# Patient Record
Sex: FEMALE | Race: WHITE | NOT HISPANIC OR LATINO | Employment: PART TIME | URBAN - METROPOLITAN AREA
[De-identification: names, ages, dates, MRNs, and addresses within clinical notes are randomized per-mention and may not be internally consistent; named-entity substitution may affect disease eponyms.]

---

## 2017-01-19 ENCOUNTER — OFFICE VISIT - HEALTHEAST (OUTPATIENT)
Dept: FAMILY MEDICINE | Facility: CLINIC | Age: 37
End: 2017-01-19

## 2017-01-19 DIAGNOSIS — F41.1 GENERALIZED ANXIETY DISORDER: ICD-10-CM

## 2017-03-16 ENCOUNTER — OFFICE VISIT - HEALTHEAST (OUTPATIENT)
Dept: FAMILY MEDICINE | Facility: CLINIC | Age: 37
End: 2017-03-16

## 2017-03-16 DIAGNOSIS — R53.83 OTHER FATIGUE: ICD-10-CM

## 2017-03-16 DIAGNOSIS — F41.1 GENERALIZED ANXIETY DISORDER: ICD-10-CM

## 2017-03-24 ENCOUNTER — COMMUNICATION - HEALTHEAST (OUTPATIENT)
Dept: FAMILY MEDICINE | Facility: CLINIC | Age: 37
End: 2017-03-24

## 2017-05-16 ENCOUNTER — COMMUNICATION - HEALTHEAST (OUTPATIENT)
Dept: FAMILY MEDICINE | Facility: CLINIC | Age: 37
End: 2017-05-16

## 2017-05-23 ENCOUNTER — COMMUNICATION - HEALTHEAST (OUTPATIENT)
Dept: SCHEDULING | Facility: CLINIC | Age: 37
End: 2017-05-23

## 2017-06-01 ENCOUNTER — OFFICE VISIT - HEALTHEAST (OUTPATIENT)
Dept: FAMILY MEDICINE | Facility: CLINIC | Age: 37
End: 2017-06-01

## 2017-06-01 DIAGNOSIS — F41.1 GENERALIZED ANXIETY DISORDER: ICD-10-CM

## 2017-06-15 ENCOUNTER — COMMUNICATION - HEALTHEAST (OUTPATIENT)
Dept: FAMILY MEDICINE | Facility: CLINIC | Age: 37
End: 2017-06-15

## 2017-09-06 ENCOUNTER — OFFICE VISIT - HEALTHEAST (OUTPATIENT)
Dept: FAMILY MEDICINE | Facility: CLINIC | Age: 37
End: 2017-09-06

## 2017-09-06 DIAGNOSIS — F41.1 GENERALIZED ANXIETY DISORDER: ICD-10-CM

## 2017-09-06 DIAGNOSIS — M22.2X1 PATELLOFEMORAL SYNDROME OF BOTH KNEES: ICD-10-CM

## 2017-09-06 DIAGNOSIS — M22.2X2 PATELLOFEMORAL SYNDROME OF BOTH KNEES: ICD-10-CM

## 2017-10-04 ENCOUNTER — AMBULATORY - HEALTHEAST (OUTPATIENT)
Dept: NURSING | Facility: CLINIC | Age: 37
End: 2017-10-04

## 2017-10-11 ENCOUNTER — COMMUNICATION - HEALTHEAST (OUTPATIENT)
Dept: FAMILY MEDICINE | Facility: CLINIC | Age: 37
End: 2017-10-11

## 2017-11-06 ENCOUNTER — OFFICE VISIT - HEALTHEAST (OUTPATIENT)
Dept: FAMILY MEDICINE | Facility: CLINIC | Age: 37
End: 2017-11-06

## 2017-11-06 DIAGNOSIS — F41.1 GENERALIZED ANXIETY DISORDER: ICD-10-CM

## 2018-02-20 ENCOUNTER — COMMUNICATION - HEALTHEAST (OUTPATIENT)
Dept: FAMILY MEDICINE | Facility: CLINIC | Age: 38
End: 2018-02-20

## 2018-03-05 ENCOUNTER — OFFICE VISIT - HEALTHEAST (OUTPATIENT)
Dept: FAMILY MEDICINE | Facility: CLINIC | Age: 38
End: 2018-03-05

## 2018-03-05 DIAGNOSIS — Z71.3 WEIGHT LOSS COUNSELING, ENCOUNTER FOR: ICD-10-CM

## 2018-03-05 DIAGNOSIS — M79.671 ARCH PAIN OF RIGHT FOOT: ICD-10-CM

## 2018-06-21 ENCOUNTER — RECORDS - HEALTHEAST (OUTPATIENT)
Dept: ADMINISTRATIVE | Facility: OTHER | Age: 38
End: 2018-06-21

## 2018-09-09 ENCOUNTER — NURSE TRIAGE (OUTPATIENT)
Dept: NURSING | Facility: CLINIC | Age: 38
End: 2018-09-09

## 2018-09-09 NOTE — TELEPHONE ENCOUNTER
"  Reason for Disposition    Looks like a broken bone (e.g., crooked or deformed)    Additional Information    Negative: Wound looks infected    Negative: Caused by animal bite    Negative: Caused by human bite    Negative: Amputated finger    Negative: Skin is split open or gaping  (or length > 1/2 inch or 12 mm)    Negative: [1] Bleeding AND [2] won't stop after 10 minutes of direct pressure (using correct technique)    Negative: [1] Dirt in the wound AND [2] not removed with 15 minutes of scrubbing    Negative: High pressure injection injury (e.g., from grease gun or paint gun, usually work-related)    Negative: Looks like a dislocated joint (e.g., crooked or deformed)    Negative: [1] Fingernail is partially torn AND [2] from crush injury  (Exception: torn nail from catching it on something)    Negative: [1] Cut AND [2] numbness (loss of sensation) of finger    Negative: [1] Cut AND [2] finger joint can't be opened (straightened) or closed (bent) completely    Negative: Sounds like a serious injury to the triager    Answer Assessment - Initial Assessment Questions  1. MECHANISM: \"How did the injury happen?\"       She jammed her finger into a boulder  2. ONSET: \"When did the injury happen?\" (Minutes or hours ago)       Last evening  3. LOCATION: \"What part of the finger is injured?\" \"Is the nail damaged?\"       Left pointer finger  4. APPEARANCE of the INJURY: \"What does the injury look like?\"       Very swollen  5. SEVERITY: \"Can you use the hand normally?\"  \"Can you bend your fingers into a ball and then fully open them?\"      no  6. SIZE: For cuts, bruises, or swelling, ask: \"How large is it?\" (e.g., inches or centimeters;  entire finger)       She reports she is very queasy and can't look at her hand or she will vomit  7. PAIN: \"Is there pain?\" If so, ask: \"How bad is the pain?\"    (e.g., Scale 1-10; or mild, moderate, severe)      yes  8. TETANUS: For any breaks in the skin, ask: \"When was the last tetanus " "booster?\"      unknown  9. OTHER SYMPTOMS: \"Do you have any other symptoms?\"      nausea  10. PREGNANCY: \"Is there any chance you are pregnant?\" \"When was your last menstrual period?\"        n/a    Protocols used: FINGER INJURY-ADULT-AH    "

## 2018-10-29 ENCOUNTER — RECORDS - HEALTHEAST (OUTPATIENT)
Dept: GENERAL RADIOLOGY | Facility: CLINIC | Age: 38
End: 2018-10-29

## 2018-10-29 ENCOUNTER — OFFICE VISIT - HEALTHEAST (OUTPATIENT)
Dept: FAMILY MEDICINE | Facility: CLINIC | Age: 38
End: 2018-10-29

## 2018-10-29 DIAGNOSIS — S69.90XA FINGER INJURY: ICD-10-CM

## 2018-10-29 DIAGNOSIS — S69.90XA UNSPECIFIED INJURY OF UNSPECIFIED WRIST, HAND AND FINGER(S), INITIAL ENCOUNTER: ICD-10-CM

## 2018-11-13 ENCOUNTER — OFFICE VISIT - HEALTHEAST (OUTPATIENT)
Dept: OCCUPATIONAL THERAPY | Facility: REHABILITATION | Age: 38
End: 2018-11-13

## 2018-11-13 DIAGNOSIS — M79.645 PAIN OF FINGER OF LEFT HAND: ICD-10-CM

## 2018-11-13 DIAGNOSIS — Z78.9 DECREASED ACTIVITIES OF DAILY LIVING (ADL): ICD-10-CM

## 2018-11-13 DIAGNOSIS — R29.898 WEAKNESS OF LEFT HAND: ICD-10-CM

## 2018-11-27 ENCOUNTER — OFFICE VISIT - HEALTHEAST (OUTPATIENT)
Dept: OCCUPATIONAL THERAPY | Facility: REHABILITATION | Age: 38
End: 2018-11-27

## 2018-11-27 DIAGNOSIS — M79.645 PAIN OF FINGER OF LEFT HAND: ICD-10-CM

## 2018-11-27 DIAGNOSIS — Z78.9 DECREASED ACTIVITIES OF DAILY LIVING (ADL): ICD-10-CM

## 2018-11-27 DIAGNOSIS — R29.898 WEAKNESS OF LEFT HAND: ICD-10-CM

## 2018-12-06 ENCOUNTER — OFFICE VISIT - HEALTHEAST (OUTPATIENT)
Dept: OCCUPATIONAL THERAPY | Facility: REHABILITATION | Age: 38
End: 2018-12-06

## 2018-12-06 DIAGNOSIS — M79.645 PAIN OF FINGER OF LEFT HAND: ICD-10-CM

## 2018-12-06 DIAGNOSIS — R29.898 WEAKNESS OF LEFT HAND: ICD-10-CM

## 2018-12-06 DIAGNOSIS — Z78.9 DECREASED ACTIVITIES OF DAILY LIVING (ADL): ICD-10-CM

## 2018-12-27 ENCOUNTER — OFFICE VISIT - HEALTHEAST (OUTPATIENT)
Dept: OCCUPATIONAL THERAPY | Facility: REHABILITATION | Age: 38
End: 2018-12-27

## 2018-12-27 DIAGNOSIS — Z78.9 DECREASED ACTIVITIES OF DAILY LIVING (ADL): ICD-10-CM

## 2018-12-27 DIAGNOSIS — R29.898 WEAKNESS OF LEFT HAND: ICD-10-CM

## 2018-12-27 DIAGNOSIS — M79.645 PAIN OF FINGER OF LEFT HAND: ICD-10-CM

## 2019-05-28 ENCOUNTER — COMMUNICATION - HEALTHEAST (OUTPATIENT)
Dept: SCHEDULING | Facility: CLINIC | Age: 39
End: 2019-05-28

## 2019-12-16 ENCOUNTER — OFFICE VISIT - HEALTHEAST (OUTPATIENT)
Dept: FAMILY MEDICINE | Facility: CLINIC | Age: 39
End: 2019-12-16

## 2019-12-16 DIAGNOSIS — Z13.220 LIPID SCREENING: ICD-10-CM

## 2019-12-16 DIAGNOSIS — F40.243 FLYING PHOBIA: ICD-10-CM

## 2019-12-16 DIAGNOSIS — Z00.00 ROUTINE GENERAL MEDICAL EXAMINATION AT A HEALTH CARE FACILITY: ICD-10-CM

## 2019-12-16 DIAGNOSIS — Z13.1 SCREENING FOR DIABETES MELLITUS: ICD-10-CM

## 2019-12-16 LAB
CHOLEST SERPL-MCNC: 200 MG/DL
FASTING STATUS PATIENT QL REPORTED: YES
FASTING STATUS PATIENT QL REPORTED: YES
GLUCOSE BLD-MCNC: 84 MG/DL (ref 70–99)
HDLC SERPL-MCNC: 53 MG/DL
LDLC SERPL CALC-MCNC: 118 MG/DL
TRIGL SERPL-MCNC: 147 MG/DL

## 2019-12-16 RX ORDER — ALPRAZOLAM 0.5 MG
TABLET ORAL
Qty: 20 TABLET | Refills: 0 | Status: SHIPPED | OUTPATIENT
Start: 2019-12-16 | End: 2022-10-05

## 2019-12-16 ASSESSMENT — MIFFLIN-ST. JEOR: SCORE: 1506.16

## 2019-12-30 ENCOUNTER — COMMUNICATION - HEALTHEAST (OUTPATIENT)
Dept: FAMILY MEDICINE | Facility: CLINIC | Age: 39
End: 2019-12-30

## 2020-10-19 ENCOUNTER — COMMUNICATION - HEALTHEAST (OUTPATIENT)
Dept: FAMILY MEDICINE | Facility: CLINIC | Age: 40
End: 2020-10-19

## 2020-10-19 DIAGNOSIS — Z80.3 FAMILY HISTORY OF BREAST CANCER IN MOTHER: ICD-10-CM

## 2020-10-19 DIAGNOSIS — Z12.31 VISIT FOR SCREENING MAMMOGRAM: ICD-10-CM

## 2020-10-29 ENCOUNTER — COMMUNICATION - HEALTHEAST (OUTPATIENT)
Dept: ADMINISTRATIVE | Facility: HOSPITAL | Age: 40
End: 2020-10-29

## 2020-11-12 ENCOUNTER — HOSPITAL ENCOUNTER (OUTPATIENT)
Dept: MAMMOGRAPHY | Facility: CLINIC | Age: 40
Discharge: HOME | End: 2020-11-12
Attending: FAMILY MEDICINE

## 2020-11-12 DIAGNOSIS — Z12.31 VISIT FOR SCREENING MAMMOGRAM: ICD-10-CM

## 2020-11-16 ENCOUNTER — OFFICE VISIT - HEALTHEAST (OUTPATIENT)
Dept: ONCOLOGY | Facility: HOSPITAL | Age: 40
End: 2020-11-16

## 2020-11-16 DIAGNOSIS — Z80.3 FAMILY HISTORY OF MALIGNANT NEOPLASM OF BREAST: ICD-10-CM

## 2020-11-16 DIAGNOSIS — Z80.42 FAMILY HISTORY OF PROSTATE CANCER: ICD-10-CM

## 2020-11-16 DIAGNOSIS — Z71.83 ENCOUNTER FOR NONPROCREATIVE GENETIC COUNSELING: ICD-10-CM

## 2020-11-18 ENCOUNTER — RECORDS - HEALTHEAST (OUTPATIENT)
Dept: ADMINISTRATIVE | Facility: OTHER | Age: 40
End: 2020-11-18

## 2020-11-18 ENCOUNTER — AMBULATORY - HEALTHEAST (OUTPATIENT)
Dept: INFUSION THERAPY | Facility: HOSPITAL | Age: 40
End: 2020-11-18

## 2020-11-18 DIAGNOSIS — Z80.3 FAMILY HISTORY OF MALIGNANT NEOPLASM OF BREAST: ICD-10-CM

## 2020-11-18 DIAGNOSIS — Z80.42 FAMILY HISTORY OF PROSTATE CANCER: ICD-10-CM

## 2020-11-25 ENCOUNTER — HOSPITAL ENCOUNTER (OUTPATIENT)
Dept: MAMMOGRAPHY | Facility: CLINIC | Age: 40
Discharge: HOME | End: 2020-11-25
Attending: FAMILY MEDICINE

## 2020-11-25 ENCOUNTER — HOSPITAL ENCOUNTER (OUTPATIENT)
Dept: ULTRASOUND IMAGING | Facility: CLINIC | Age: 40
Discharge: HOME | End: 2020-11-25
Attending: FAMILY MEDICINE

## 2020-11-25 DIAGNOSIS — N64.89 BREAST ASYMMETRY: ICD-10-CM

## 2020-12-04 ENCOUNTER — COMMUNICATION - HEALTHEAST (OUTPATIENT)
Dept: ADMINISTRATIVE | Facility: HOSPITAL | Age: 40
End: 2020-12-04

## 2020-12-09 ENCOUNTER — OFFICE VISIT - HEALTHEAST (OUTPATIENT)
Dept: ONCOLOGY | Facility: HOSPITAL | Age: 40
End: 2020-12-09

## 2020-12-09 DIAGNOSIS — Z71.83 ENCOUNTER FOR NONPROCREATIVE GENETIC COUNSELING: ICD-10-CM

## 2020-12-09 DIAGNOSIS — Z80.3 FAMILY HISTORY OF MALIGNANT NEOPLASM OF BREAST: ICD-10-CM

## 2020-12-09 DIAGNOSIS — Z80.42 FAMILY HISTORY OF PROSTATE CANCER: ICD-10-CM

## 2021-05-29 NOTE — TELEPHONE ENCOUNTER
Pt is calling in as TH evening she had diarrhea  Fri she had body aches and skin ached all day long-felt feverish  Saturday felt warm and still sick with a headache and head felt like a balloon  Sunday and Monday just feeling generally unwell  Diarrhea is not bad  Not eating much for 4 days  Diarrhea better and not diarrhea but is not normal quite yet  Generally not feeling good and nauseated and slight faint and is at work and woozy  No fever  Pt is urinating ok  Pt young Daughter vomited last week and now pt has sx  No respiratory sx  No coughing noted  Pt not sure why it is lasting so long  Pt prefers to push fluids and see if getting better in an hour    Suzanne Irwin, RN [facility] RN Triage      Reason for Disposition    MILD-MODERATE diarrhea (e.g., 1-6 times / day more than normal)    Protocols used: DIARRHEA-A-OH

## 2021-05-30 VITALS — BODY MASS INDEX: 26.91 KG/M2 | WEIGHT: 168 LBS

## 2021-05-30 VITALS — BODY MASS INDEX: 26.75 KG/M2 | WEIGHT: 167 LBS

## 2021-05-31 VITALS — WEIGHT: 170.5 LBS | BODY MASS INDEX: 27.31 KG/M2

## 2021-05-31 VITALS — BODY MASS INDEX: 28.99 KG/M2 | WEIGHT: 181 LBS

## 2021-05-31 VITALS — BODY MASS INDEX: 28.35 KG/M2 | WEIGHT: 177 LBS

## 2021-06-01 ENCOUNTER — RECORDS - HEALTHEAST (OUTPATIENT)
Dept: ADMINISTRATIVE | Facility: CLINIC | Age: 41
End: 2021-06-01

## 2021-06-01 VITALS — WEIGHT: 186 LBS | BODY MASS INDEX: 29.8 KG/M2

## 2021-06-02 VITALS — WEIGHT: 189.5 LBS | BODY MASS INDEX: 30.36 KG/M2

## 2021-06-04 VITALS
DIASTOLIC BLOOD PRESSURE: 70 MMHG | OXYGEN SATURATION: 100 % | HEART RATE: 74 BPM | HEIGHT: 67 IN | SYSTOLIC BLOOD PRESSURE: 100 MMHG | WEIGHT: 180 LBS | BODY MASS INDEX: 28.25 KG/M2

## 2021-06-04 NOTE — PATIENT INSTRUCTIONS - HE
1) Glucosamine with turmeric:  1,500 mg a day.  2) Magnesium glycinate 400 mg - 800 mg each night for sleep and calm.

## 2021-06-04 NOTE — PROGRESS NOTES
FEMALE ADULT PREVENTIVE EXAM    CHIEF COMPLAINT:  Female preventive exam.    SUBJECTIVE:  Vibha Hall is a 39 y.o. female who presents for her routine physical exam.    Patient would like to address the following concerns today:     Fear with flying.  Had a panic attack with last plane trip to [place].  Also has a lot of difficulty sleeping over at peoples homes.  Feels claustrophobia about throwing up due to parental history.  Finds herself avoiding travel more and more.  Has anxiety around dental treatment.  Takes alprazolam before dental appointments.  Thinking about going to [place] in Spring Break.  She feels like having the alprazolam acts as a safety blanket when she travels.  She does not need to take it, but it is helpful when she knows that it is there.  Would like a refill today for the upcoming trip.       GYNE HISTORY  Menses: regular.  Sexually Active: with   Contraception: Paragard IUD about 5 years.    Last Pap: 2016 - normal and negative.  Abnormal Pap: none  Vaginal Discharge: Yes, clear, non irritating.        She  has no past medical history on file.    Lab Results   Component Value Date    WBC 6.3 03/16/2017    HGB 13.2 03/16/2017    HCT 39.5 03/16/2017    MCV 88 03/16/2017     03/16/2017     11/06/2017    K 4.2 11/06/2017    BUN 11 11/06/2017     Lab Results   Component Value Date    CHOL 200 (H) 12/16/2019    HDL 53 12/16/2019    LDLCALC 118 12/16/2019    TRIG 147 12/16/2019     Lab Results   Component Value Date    TSH 0.41 03/16/2017     BP Readings from Last 3 Encounters:   12/16/19 100/70   10/29/18 98/66   03/05/18 104/58       Surgeries:  No past surgical history on file.    Family History:  Her family history includes COPD in her paternal aunt; Cancer in her maternal grandfather and paternal uncle; Cancer (age of onset: 47) in her mother.    Social History:  She  reports that she has never smoked. She has never used smokeless tobacco.    Medications:    Current  Outpatient Medications:      cholecalciferol, vitamin D3, (VITAMIN D3) 2,000 unit cap, Take by mouth., Disp: , Rfl:      INTRAUTERINE DEVICE, IUD, UTRN, Paraguard., Disp: , Rfl:      ALPRAZolam (XANAX) 0.5 MG tablet, Take one tablet as needed for flight anxiety or before bedtime., Disp: 20 tablet, Rfl: 0      Allergies:  No latex allergies.  Allergies   Allergen Reactions     Wellbutrin [Bupropion Hcl] Other (See Comments)     Severe anxiety attacks        RISK BEHAVIOR & HEALTH HABITS  Self Breast Exam: sometimes.   Regular Exercise: no time.  Will   Balanced diet: for the most part.  Eating more sugar as of late..  Seat Belt Use: yes  Calcium intake/Osteoporosis prevention: sometimes eat yogurt.    Guns: NO  Sun Screen: YES  Dental Care: YES    REVIEW OF SYSTEMS:  Complete head to toe review of systems is otherwise negative except as above.    OBJECTIVE:  VITAL SIGNS:    Vitals:    12/16/19 1108   BP: 100/70   Pulse: 74   SpO2: 100%     GENERAL:  Patient alert, in no acute distress.  EYES: PERRLA. Extraoccular movements intact, pupils equal, reactive to light and accommodation.  Normal conjunctiva and lids.  Undilated fudoscopic exam normal, including normal size, appearance cup-to-disc ratio.  Normal posterior segments, including no exudates or hemorrhages.  ENT:  Hearing grossly normal.  Normal appearance to ears and nose.  Bilateral TM s, external canals, oropharynx normal. Normal lips, gums and teeth.  Normal nasal mucosa, septum and turbinates.  NECK:  Supple, without thyromegaly or mass.  RESP:  Clear to auscultation without crackles, wheezes or distress.  Normal respiratory effort.   CV:  Regular rate and rhythm without murmurs, rubs or gallops.  Normal carotid, abdominal aorta, femoral and pedal pulses.  No varicosities or edema.  ABDOMEN:  Soft, non-tender, without hepatosplenomegaly, masses, or hernias.   BREASTS:  Nontender, without masses, nipple discharge, erythema, or axillary adenopathy.    :     External genitalia:  Normal without lesions.  Urethra: Normal appearing  Vagina: Normal without discharge  Cervix: IUD string at os.  Uterus:  Nontender, mobile, without masses  Ovaries: Normal without masses  LYMPHATIC: Normal palpation of neck, groin and axilla..  No lymphadenopathy.  No bruising.  NEURO:  CN II-XII intact, motor & sensory function all intact.  DTR and reflexes normal.  PSYCHIATRIC:  Alert & oriented with normal mood and affect.  Good judgment and insight.  SKIN:  Normal inspection and palpation.  MUSCULOSKELETAL: Normal gait and station.  - Spine / Ribs / Pelvis: Normal inspection, ROM, stability and strength: Spine, Head, Neck, Upper and Lower Extremities.    ASSESSMENT & PLAN  Vibha was seen today for annual exam and anxiety.    Diagnoses and all orders for this visit:    Routine general medical examination at a health care facility  Patient had been working with her eating disorder.  She knows what she needs to do to get back to healthy lifestyle.   Reviewed family history for high risk screening.    Flying phobia  -     ALPRAZolam (XANAX) 0.5 MG tablet; Take one tablet as needed for flight anxiety or before bedtime.  Also difficulty with sleeping in beds away, as well as fear of vomiting and dental appointments.  Controlled substance agreement signed today.    Lipid screening  -     Lipid [place]    Screening for diabetes mellitus  -     Glucose    Got flu shot outside clinic.

## 2021-06-08 NOTE — PROGRESS NOTES
Patient is a 36-year-old female who presents today with concerns about anxiety.      Patient was initially started on Wellbutrin XL back in 2015 due to major depressive symptoms as well as difficulty with focusing and completing tasks.  He shouldn't reacted initially quite well to the Wellbutrin, she notes she is now very organized, her house is very clean, and she is able to complete her tasks.  Unfortunately her symptoms have significantly worsened since this fall.  She is now having problems with anxiety.  She has found that she's got a lot of phobias.  One of them is her kids getting sick.  If she hears one child Gottlieb night, she gets concerned that they're getting sick, and (what she realizes is irrational) that they might die.  Then she has difficulties with sleep.  She also has a phobia about vomiting.  When her kids get sick, she has a fear that she'll get sick and have to vomit.  He finds that her mind goes to dark places frequently.  She's been having panic attacks where she feels like the walls are closing in.  She feels like she is operating in a constant state of dread.  If she is driving with her kids in the back seat, she is very anxious that somehow she'll make a mistake and get into an accident.  She notes that her anxiety got a lot worse over the holidays.  It is now affecting her sleep.  Patient prefers more natural approaches, but realizes in her state that medication would likely be more helpful.    Objective     Visit Vitals     BP 94/60 (Patient Site: Left Arm, Patient Position: Sitting)     Pulse 90     Wt 168 lb (76.2 kg)     SpO2 99%     Breastfeeding No  Comment: IUD- Paragard     BMI 26.91 kg/m2     General appearance: alert, appears stated age and cooperative   Psych Exam:  Behavior:anxious, tearful when discussing anxiety  Mood:anxious   Affect:normal   Eye Contact:normal   Thought Content: normal   Insight:normal    Judgement: normal     Vibha was seen today for medication  management.    Diagnoses and all orders for this visit:    Generalized anxiety disorder  -     FLUoxetine (PROZAC) 20 MG capsule; Take 1 capsule (20 mg total) by mouth daily.  Patient will overlap wellbutrin and fluoxetine by one week before stopping Wellbutrin.  Discussed Magnesium 400 mg qhs for sleep.  Recommend lavendar essential oil.  She will continue to cut back on caffeine by subbing out one tablespoon of caffeine for decaf per week.  Recommend close follow up in 4 weeks, sooner if having problems.

## 2021-06-09 NOTE — PROGRESS NOTES
Patient is a 36-year-old female who presents today for follow-up of her anxiety.    Patient notes that since starting on the fluoxetine she has felt a lot better.  She notes significantly less anxiety.  She notices more motivation, to the point where she is working out 3-4 times a week.  She is eating a lot better with her health and mind.  She denies any side effects such as jitteriness, no issues with libido, no issues with sleep.  She does continue to have a specific phobia against vomiting.  This is due to the fact that as a child, she was punished when she would vomit as it would create a mess.  We discussed the possibility of having Zofran on hand indicates that she does vomit; however, patient feels that she just needs to vomit and be okay with the fact that it might cause a mess.      Patient does note that she continues to have significant fatigue.  It is to the point where she can nap about 1 hour in any given day.  She feels that her sleep is fine, she notes no night waking her snoring.  She would like to have labs checked today.    Objective:  Visit Vitals     /60 (Patient Site: Left Arm, Patient Position: Sitting)     Pulse 81     Wt 167 lb (75.8 kg)     SpO2 99%     Breastfeeding No     BMI 26.75 kg/m2     General appearance: alert, appears stated age and cooperative   Psych Exam:  Behavior:normal    Mood:pleaseant, upbeat   Affect:normal   Eye Contact:normal   Thought Content: normal   Insight:normal    Judgement: normal     Little interest or pleasure in doing things: Several days  Feeling down, depressed, or hopeless: More than half the days  Trouble falling or staying asleep, or sleeping too much: Not at all  Feeling tired or having little energy: Not at all  Poor appetite or overeating: Not at all  Feeling bad about yourself - or that you are a failure or have let yourself or your family down: Not at all  Trouble concentrating on things, such as reading the newspaper or watching television:  Not at all  Moving or speaking so slowly that other people could have noticed. Or the opposite - being so fidgety or restless that you have been moving around a lot more than usual: Not at all  Thoughts that you would be better off dead, or of hurting yourself in some way: Not at all  PHQ-9 Total Score: 3    Feeling nervous, anxious or on edge: 2  Not being able to stop or control worry: 0  Worrying too much about different things: 0  Trouble relaxin  Being so restless that is is hard to sit still: 0  Becoming easily annnoyed or irritable: 0  Feeling afraid as if something awful might happen: 1  ASHLY-7 Total: 3  How difficult did these problems make it for you to do your work, take care of things at home or get along with other people? : Not difficult at all      Vibha was seen today for follow-up and medication management.    Diagnoses and all orders for this visit:    Generalized anxiety disorder  -     FLUoxetine (PROZAC) 20 MG capsule; Take 1 capsule (20 mg total) by mouth daily.  Patient will like to continue on this medication her symptoms are very well controlled.  Recommend follow-up in 3-6 months.    Other fatigue  -     HM1(CBC and Differential)  -     Thyroid [place]  -     Vitamin D, Total (25-Hydroxy)  -     HM1 (CBC with Diff)  Patient would like labs checked for organic causes of fatigue.  She is taking vitamin D, fish oil, as well as a probiotic.

## 2021-06-11 NOTE — PROGRESS NOTES
Patient is a 36-year-old female presents today for follow-up of her anxiety.    Patient has a history of generalized anxiety disorder that improved dramatically on fluoxetine.  Over time, patient is realized that her anxiety is really worse during the cold winter months.  Now that it is Sunday, patient feels a lot calmer.  She has stopped her fluoxetine 2 weeks ago, and does not feel the same amount of anxiety that she had in the past.  She is exercising regularly and eating better.  In fact she has an appointment with a nutritionist at 1 PM for nutritional weight loss.  She notes that her biggest difficulty with the fluoxetine was lack of libido and anorgasmia.  Was to the point was causing a little bit of stress in her relationship with her .  She therefore stopped the fluoxetine about a week ago.  She feels okay.  She notes that there is a bigger role of maintaining her lifestyle changes on her mood.  She notes that she stresses about her children's refusal to eat the healthy food she prepares.  Discussed decreasing stress at the dinner table.  Patient has emotional stress around food due to her childhood.  She is worried about transferring that to her own children.      Objective     BP 92/68 (Patient Site: Left Arm, Patient Position: Sitting)  Pulse 72  Wt 170 lb 8 oz (77.3 kg)  SpO2 99%  Breastfeeding? No  BMI 27.31 kg/m2  General appearance: alert, appears stated age and cooperative  Psych Exam:  Behavior:normal    Mood:pleaseant, upbeat   Affect:normal   Eye Contact:normal   Thought Content: normal   Insight:normal    Judgement: normal     Little interest or pleasure in doing things: Not at all  Feeling down, depressed, or hopeless: Several days  Trouble falling or staying asleep, or sleeping too much: Several days  Feeling tired or having little energy: Several days  Poor appetite or overeating: More than half the days  Feeling bad about yourself - or that you are a failure or have let yourself  or your family down: Several days  Trouble concentrating on things, such as reading the newspaper or watching television: Not at all  Moving or speaking so slowly that other people could have noticed. Or the opposite - being so fidgety or restless that you have been moving around a lot more than usual: Not at all  Thoughts that you would be better off dead, or of hurting yourself in some way: Not at all  PHQ-9 Total Score: 6  If you checked off any problems, how difficult have these problems made it for you to do your work, take care of things at home, or get along with other people?: Not difficult at all          Vibha was seen today for medication management.    Diagnoses and all orders for this visit:    Generalized anxiety disorder  It appears to peak during cold months.  Patient feels well currently without the medication, but had full anxiety attacks during the winter.  Discussed Vitamin D supplementation at 4,000 IU daily year round.  Discussed lavender essential oil.  Will recontemplate medication this fall as there is a strong SAD component to this.  Due to poor libido and anorgasmia on fluoxetine, could consider escitalopram vs. Venlafaxine.

## 2021-06-12 NOTE — PROGRESS NOTES
Patient is a 36-year-old female presents today for follow-up of her generalized anxiety disorder.    Patient had pretty significant general anxiety disorder last winter, to the point where she was having panic attacks at home.  During that time, I had started her on fluoxetine, which was enormously helpful for her.  She had the unfortunate side effect of having low libido and anorgasmia.  For that reason, she did stop the medication, and it was fine during the summer.  She is here to discuss medications for this upcoming  and winter as she realizes that there appears to be a seasonal component to her anxiety.    Overall, the end of the summer was a little bit difficult for her.  She notes that her parents are elderly, living in [place], and they are now moving to [facility].  He has simple phobia against illnesses based on her upbringing.  Now that the school year has started, and the winter season is coming, patient can feel her anxiety about her kids getting sick ramping up.  It did not help that her mother got sick in September and  in January.  She would like to restart on fluoxetine, but is wondering about what we can do with regards to the libido.  Patient notes that when she is stressed, she does tend to eat.  She had worked with the [employer] program and was not impressed with it.  She has been to a couple over eaters anonymous meetings and felt that was helpful, but she notes pretty low motivation to make that time.    Patient also notes a problem with her knees.  She can feel clicking of her knees with deep knee bends.  She notes pain in her knees with prolonged kneeling.  She does have 2 younger children.  She is wondering what she can do for her knee issue.  Patient would prefer more natural methods.    Objective     BP 92/60 (Patient Site: Left Arm, Patient Position: Sitting)  Pulse 67  Wt 181 lb (82.1 kg)  SpO2 98%  BMI 28.99 kg/m2  General appearance: alert, appears stated age and cooperative    Psych Exam:  Behavior:anxious    Mood:anxious   Affect:normal   Eye Contact:normal   Thought Content: normal   Insight:normal    Judgement: normal   Little interest or pleasure in doing things: Not at all  Feeling down, depressed, or hopeless: More than half the days  Trouble falling or staying asleep, or sleeping too much: More than half the days  Feeling tired or having little energy: More than half the days  Poor appetite or overeating: More than half the days  Feeling bad about yourself - or that you are a failure or have let yourself or your family down: Several days  Trouble concentrating on things, such as reading the newspaper or watching television: Not at all  Moving or speaking so slowly that other people could have noticed. Or the opposite - being so fidgety or restless that you have been moving around a lot more than usual: Not at all  Thoughts that you would be better off dead, or of hurting yourself in some way: Several days  PHQ-9 Total Score: 10  If you checked off any problems, how difficult have these problems made it for you to do your work, take care of things at home, or get along with other people?: Somewhat difficult    Knee Exam:  Inspection: No evidence of swelling, erythema  Palpation: No tenderness palpation along the joint lines, peds anserine bursa, or ligament  Range of motion: Normal flexion, normal extension  Keaun's: Negative bilaterally  Grind test: Positive bilaterally, with mild crepitus of the kneecap with flexion  Anterior drawer sign: Negative bilaterally        Vibha was seen today for follow-up.    Diagnoses and all orders for this visit:    Generalized anxiety disorder  -     FLUoxetine (PROZAC) 20 MG capsule; Take 1 capsule (20 mg total) by mouth daily.  -     buPROPion (WELLBUTRIN XL) 150 MG 24 hr tablet; Take 1 tablet (150 mg total) by mouth daily.  Discussed restarting her on fluoxetine with the addition of bupropion to assist with the sexual side effects as well  as with motivation.  Patient did like the fact that fluoxetine had worked well for her stress eating.  We did discuss use of the bupropion as well as potential side effects including insomnia.  Did discuss expected outcomes.  Recommend return to clinic in 6-8 weeks for follow-up.    Patellofemoral syndrome of both knees  We did discuss a strengthening exercises as well as the pathophysiology of patellofemoral syndrome.  We did discuss the potential benefits of glucosamine 1500 mg daily.  Discussed potentially using half an ounce of sour cherry juice daily.

## 2021-06-13 NOTE — PROGRESS NOTES
Patient is a 36-year-old female presents for generalized anxiety.    At the last visit, we had added Wellbutrin to see if we can improve her sex drive since it is quite flat on the fluoxetine.  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  It made her sweaty, and it made her feel hopeless that she was losing her mind.  Since she stopped the medication, she has felt better.  She notes that on the fluoxetine, it is still working great, but no sex drive.  She notes that she is more motivated, has a longer fuse, feels a lot more even.  She feels is apparent that she is a lot more present.  Her goal is to be off of the medication and therefore she works very hard on her lifestyle.  She is walking a lot more.  In fact she walked to clinic today from her [facility].  She is meditating, she is trying acupuncture.  She is eating healthier.  She is also doing elderberry, Echinacea, tart cherry juice.      Patient Active Problem List    Diagnosis Date Noted     Pregnancy      Sciatica      Gynecologic Services Intrauterine Device (IUD) Insertion      Gynecologic Services Intrauterine Device (IUD) Checking        Current Outpatient Prescriptions:      cholecalciferol, vitamin D3, (VITAMIN D3) 2,000 unit cap, Take by mouth., Disp: , Rfl:      FLUoxetine (PROZAC) 20 MG capsule, Take 1 capsule (20 mg total) by mouth daily., Disp: 90 capsule, Rfl: 0     INTRAUTERINE DEVICE, IUD, Paulo PADRON., Disp: , Rfl:      OMEGA-3S/DHA/EPA/FISH OIL (OMEGA 3 ORAL), Take by mouth., Disp: , Rfl:      UNABLE TO FIND, Med Name: Tart Cherry Juice-1 shot daily, Disp: , Rfl:      NON FORMULARY, Tumeric, Disp: , Rfl:      NON FORMULARY, Calcium and Magnesium, Disp: , Rfl:       Objective     BP 98/62 (Patient Site: Right Arm, Patient Position: Sitting, Cuff Size: Adult Regular)  Pulse 85  Wt 177 lb (80.3 kg)  SpO2 98%  BMI 28.35 kg/m2  General appearance:  alert, appears stated age and cooperative   Psych Exam:  Behavior:normal    Mood:pleaseant, upbeat   Affect:normal   Eye Contact:normal   Thought Content: normal   Insight:normal    Judgement: normal     Little interest or pleasure in doing things: Not at all  Feeling down, depressed, or hopeless: Not at all  Trouble falling or staying asleep, or sleeping too much: Several days  Feeling tired or having little energy: Several days  Poor appetite or overeating: Several days  Feeling bad about yourself - or that you are a failure or have let yourself or your family down: Not at all  Trouble concentrating on things, such as reading the newspaper or watching television: Not at all  Moving or speaking so slowly that other people could have noticed. Or the opposite - being so fidgety or restless that you have been moving around a lot more than usual: Not at all  Thoughts that you would be better off dead, or of hurting yourself in some way: Not at all  PHQ-9 Total Score: 3  If you checked off any problems, how difficult have these problems made it for you to do your work, take care of things at home, or get along with other people?: Not difficult at all    Vibha was seen today for follow-up.    Diagnoses and all orders for this visit:    Generalized anxiety disorder  -     Basic Metabolic Panel  He had talked about the possibility of switching her to something that was more neutral as far as effects on libido such as venlafaxine, however patient feels fine right now.  After most recent bout with the Wellbutrin, she would rather not try anything new during this time.  Perhaps we will consider something else in the spring.  Will check labs today.  Recommend next visit in 6 months

## 2021-06-13 NOTE — PROGRESS NOTES
"11/16/2020     Vibha Hall is a 39 y.o. female who is being evaluated via a billable telephone visit.      The patient has been notified of following:     \"This telephone visit will be conducted via a call between you and your physician/provider. We have found that certain health care needs can be provided without the need for a physical exam.  This service lets us provide the care you need with a short phone conversation.  If a prescription is necessary we can send it directly to your pharmacy.  If lab work is needed we can place an order for that and you can then stop by our lab to have the test done at a later time.    Telephone visits are billed at different rates depending on your insurance coverage. During this emergency period, for some insurers they may be billed the same as an in-person visit.  Please reach out to your insurance provider with any questions.    If during the course of the call the physician/provider feels a telephone visit is not appropriate, you will not be charged for this service.\"    Patient has given verbal consent to a Telephone visit? Yes    What phone number would you like to be contacted at? [phone]    Patient would like to receive their AVS by AVS Preference: Dilma.    Referring Provider: Linda Winters MD    Present for Today's Visit: Vibha    Presenting Information:   I spoke with Vibha Hall over the phone today for genetic counseling to discuss her personal and family history of breast and prostate cancer.  She is here today to review this history, cancer screening recommendations, and available genetic testing options.    Personal History:  Vibha is a 39 y.o. female. She does not have any personal history of cancer.      She had her first menstrual period at age 10, her first child at age 29, and is premenopausal.  Vibha has her ovaries, fallopian tubes and uterus in place, and she has had no ovarian cancer screening to date.  She reports a 2-year history of " oral contraceptive use in her late-teens and that she has never been on hormone replacement therapy.      Her most recent OB-GYN exam and Pap smear on 10/10/2016 were normal.  She had her first mammogram on 11/12/2020 that noted asymmetry in her right breast. She has a diagnostic right mammogram and ultrasound scheduled for 11/25/2020.  She has not yet had a colonoscopy.  She does not regularly do any other cancer screening at this time.  Vibha reported past tobacco use (from ages [age]), and up to about 6 drinks per week for alcohol use.    Family History: Vibha's family history os significant for the following (Please see scanned pedigree for detailed family history information)    Vibha has two daughters, ages 7 and 10, both reportedly healthy.     Vibha has three paternal half-sisters, ages 60-68; all reportedly healthy with no cancer histories.     Vibha has a paternal half-brother, age 66, who has a history of prostate cancer diagnosed in his early 60's. She does not believe it was particularly aggressive or metastatic.     Vibha's mother passed away at age 48 from metastatic breast cancer diagnosed at age 47 or 48.    Vibha has a maternal uncle who passed in his early 70's with a history of an unknown type of cancer diagnosed in his mid 60's and HIV.     Vibha's mother has a maternal first-cousin with a history of breast cancer diagnosed in her 50's. This cousin has a grandson who was diagnosed with a leukemia at a young age (Vihba reports before age 8).     Vibha's maternal grandfather passed away in his 60's from an unknown type of cancer diagnosed at an unknown age.     Vibha's father, age 90, is reportedly healthy with no cancer history. Vibha does reports that he has had a number of precancerous skin lesions removed from his head.     Vibha has a paternal aunt who passed in her early 70's with a history of an unknown type of cancer. She was a smoker. This aunt has a daughter who was recently  diagnosed with breast cancer in her 50's.     Vibha has a paternal uncle who passed away at an unknown age with a history of a tongue cancer. He was a smoker.     Her maternal ethnicity is [place]ian (Sic[place]). Her paternal ethnicity is [place], [place], and [place].  There is no known Ashkenazi Latter day ancestry on either side of her family. There is no reported consanguinity.    Discussion:    Vibha's family history of breast cancer is suggestive of a hereditary cancer syndrome.    We reviewed the features of sporadic, familial, and hereditary cancers. In looking at Vibha's family history, it is possible that a cancer susceptibility gene is present due to her mother's early-onset breast cancer, and her maternal first-cousin's once removed breast cancer. She also has a brother with prostate cancer history and a paternal first-cousin with breast cancer history.     We discussed the natural history and genetics of hereditary breast cancers. A detailed handout regarding  the information we discussed will be sent to Vibha via RE2. Topics included: inheritance pattern, cancer risks, cancer screening recommendations, and also risks, benefits and limitations of testing.    We reviewed that the most common cause of hereditary breast cancer is Hereditary Breast and Ovarian Cancer (HBOC) syndrome, which is caused by mutations in the genes BRCA1 and BRCA2. BRCA1 and BRCA2 are two genes that increase the risk for breast and ovarian cancers, among others. Women who inherit a BRCA mutation have a 50 to 85% lifetime risk of breast cancer and up to a 40% lifetime risk of ovarian cancer. This is higher than the general population lifetime risks of 12% for breast cancer and less than 2% for ovarian cancer. Men with BRCA gene mutations have up to a 7% risk of breast cancer and 20% risk of prostate cancer. Other cancers, such as pancreatic cancer and melanoma, have also been associated with BRCA mutations.    Based on her  family history, Vibha meets current National Comprehensive Cancer Network (NCCN) criteria for genetic testing of high-penetrance breast and/or ovarian cancer susceptibility genes.      We discussed that there are additional genes that could cause increased risk for breast cancer. As many of these genes present with overlapping features in a family and accurate cancer risk cannot always be established based upon the pedigree analysis alone, it would be reasonable for Vibha to consider panel genetic testing to analyze multiple genes at once.    We reviewed genetic testing options for hereditary breast and gynecologic cancers: actionable high/moderate breast and gynecologic cancer risk custom panel (CustomNext-Cancer, 19 genes, a combination of BRCANext +  STK11) and expanded high and moderate risk panel (BRCANext-Expanded, 23 genes). Vibha expressed an interest in learning as much information as possible from the testing. She opted for the BRCANext-Expanded panel.  Genetic testing is available for 23 genes associated with hereditary gynecologic, breast, and related cancers: BRCANext-Expanded (BERTHA, BARD1, BRCA1, BRCA2, BRIP1, CDH1, CHEK2, DICER1, EPCAM, MLH1, MSH2, MSH6, NBN, NF1, PALB2, PMS2, PTEN, RAD51C, RAD51D, RECQL, SMARCA4, STK11, TP53).  We discussed that some of the genes in the BRCANext-Expanded panel are associated with specific hereditary cancer syndromes and published management guidelines: Hereditary Breast and Ovarian Cancer syndrome (BRCA1, BRCA2), Tran syndrome (MLH1, MSH2, MSH6, PMS2, EPCAM), Hereditary Diffuse Gastric Cancer (CDH1), Cowden syndrome (PTEN), Li Fraumeni syndrome (TP53), Peutz-Jeghers syndrome (STK11), and Neurofibromatosis type 1 (NF1).    Risk-reducing salpingo-oophorectomy can be considered in women with mutations in BRIP1, RAD51C, or RAD51D. Breast and/or other cancer risk management guidelines are available for BERTHA, CHEK2, PALB2, NF1, and NBN.  The remaining genes (BARD1,  DICER1, RECQL, and SMARCA4) are associated with increased cancer risk and may allow us to make medical recommendations when mutations are identified.      Consent was obtained over the phone with no witness required due to the current covid19 global pandemic.    Medical Management: For Vibha, we reviewed that the information from genetic testing may determine:    additional cancer screening for which Vibha may qualify (i.e. mammogram and breast MRI, more frequent colonoscopies, more frequent dermatologic exams, etc.),    options for risk reducing surgeries Vibha could consider (i.e. bilateral mastectomy, surgery to remove her ovaries and/or uterus, etc.),      and targeted chemotherapies for Vibha if she were to develop certain cancers in the future (i.e. immunotherapy for individuals with Tran syndrome, PARP inhibitors, etc.).     These recommendations and possible targeted chemotherapies will be discussed in detail once genetic testing is completed.     I explained that Vibha will be contacted by our scheduling team to set up a lab appointment to get her blood drawn for genetic testing at either [facility] cancer care lab or [facility] cancer care lab at her earliest convenience.     Plan:  1) Today Vibha elected to proceed with BRCANext-Expanded genetic testing (23 genes) through [facility].  2) This information should be available in approximately 3-4 weeks.  3) Vibha will be contacted by our scheduling department to set up a result disclosure appointment either in person or over the phone.     Time spent over the phone: 64 minutes    Meg Burch MS, [facility]  Licensed Genetic Counselor  [facility]  [phone]

## 2021-06-13 NOTE — PROGRESS NOTES
"12/9/2020    Referring Provider: Dr. Linda Winters    Presenting Information:  I spoke to Vibha by phone today to discuss her genetic testing results. Her blood was drawn on 11/18/2020. BRCANext-Expanded testing was ordered  from [facility]. This testing was done because of Vibha's family history of breast and prostate cancer.    Genetic Testing Result: NEGATIVE  Vibha is negative for mutations in the BERTHA, BARD1, BRCA1, BRCA2, BRIP1, CDH1, CHEK2, DICER1, EPCAM, MLH1, MSH2, MSH6, NBN, NF1, PALB2, PMS2, PTEN, RAD51C, RAD51D, RECQL, SMARCA4, STK11, and TP53 genes. No mutations were found in any of the 23 genes analyzed. This test involved sequencing and deletion/duplication analysis of all genes with the exceptions of EPCAM (deletions/duplications only).    Testing did not detect an identifiable mutation associated with Hereditary Breast and Ovarian Cancer syndrome (BRCA1, BRCA2), Tran syndrome (MLH1, MSH2, MSH6, PMS2, EPCAM), Hereditary Diffuse Gastric Cancer (CDH1), Cowden syndrome (PTEN), Li Fraumeni syndrome (TP53), Peutz-Jeghers syndrome (STK11), or Neurofibromatosis type 1 (NF1).    A copy of the test report can be found in the Media tab and named \"Genetics Scan-[employer]\". The report is scanned in as a linked document.    Interpretation:  We discussed several different interpretations of this negative test result.    1. One explanation may be that there is a different gene or combination of genes and environment that are associated with the cancers in Vibha and/or her relatives.    2. It is possible that her mother or other relatives with breast cancer history did have a mutation in one of the genes Vibha was tested for, and Vibha did not inherit it.  3. There is also a small possibility that there is a mutation in one of these genes, and we could not find it with our current testing methods.       Screening:  Based on this negative test result, it is important for Vibha and her relatives to refer back to the " family history for appropriate cancer screening.      Based on her personal and family history, Vibha has a 18.5% lifetime risk of developing breast cancer based on the MAREK (v8) model.  Therefore, Vibha does not meet current National Comprehensive Cancer Network (NCCN) guidelines for high risk breast screening, which is offered to women with a 20% lifetime risk or higher. However, it is still important for Vibha to continue with routine breast screening under the care of her physicians. Breast cancer screening is generally recommended to begin approximately 10 years younger than the earliest age of breast cancer diagnosis in the family, or at age 40, whichever comes first.  In this family, screening may begin at age 37.  Vibha is encouraged to discuss breast screening with her physicians.     Other population cancer screening options, such as those recommended by the American Cancer Society and the National Comprehensive Cancer Network (NCCN), are also appropriate for Vibha and her family. These screening recommendations may change if there are changes to Vibha's personal and/or family history. Final screening recommendations should be made by each individual's managing physician.      Inheritance:  We reviewed the autosomal dominant inheritance of mutations in these 23.  We discussed that Vibha cannot/did not pass on an identifiable mutation in these genes to her children based on this test result.  Mutations in these genes do not skip generations.      Additional Testing Considerations:  No further genetic is recommended for Vibha or her family at this time. Vibha was encouraged to contact me should her personal or family history change as this may change genetic testing recommendations.     Summary:  We do not have an explanation for Vibha's family history of cancer.  Because of that, it is important that she continue with cancer screening based on her personal and family history as discussed  above.    Genetic testing is rapidly advancing, and new cancer susceptibility genes will most likely be identified in the future.  Therefore, I encouraged Vibha to contact me annually or if there are changes in her personal or family history.  This may change how we assess her cancer risk, screening, and the testing we would offer.    Plan:  1. A copy of the test results will be mailed to Vibha.  2. She plans to follow-up with her primary care providers for ongoing care.  3. She should contact me annually, or sooner if her family history changes.    If Vibha has any further questions, I encouraged her to contact me at [phone].    Time spent on the phone: 7 minutes.    Meg Burch MS, [facility]  Licensed Genetic Counselor  [facility]  [phone]

## 2021-06-16 NOTE — PROGRESS NOTES
Patient is a 36 yo female who presents with two concerns:     1) Right foot pain:  Patient has just gotten a new pair of shoes to work out in.  If she goes for a long walk, the walk is fine, but her arches hurt after the walk.  She notes no injury, no swelling.    2) Weight loss:  Patient is getting frustrated.  She is no longer taking the fluoxetine.  She goes to [employer], yoga, walking, but inconsistent.  She finds it hard to stick to any one activity.  Her diet is getting better, but she finds a difficult time with late night snacking.  She loves cheese, pretzels.  She feels like she is starving at the end of the day.  She is only eating 3 ounces of protein at dinner.  She is doing therapy surrounding her eating habits.    Objective     /58 (Patient Site: Right Arm, Patient Position: Sitting, Cuff Size: Adult Regular)  Pulse 82  Wt 186 lb (84.4 kg)  SpO2 98%  BMI 29.8 kg/m2  General appearance: alert, appears stated age and cooperative  Extremities: Right foot   Inspection: No evidence of swelling or erythema.  She has a very high medial arch  Palpation: Tenderness at the distal arch.  No tenderness at the insertion of the plantar fascia.  ROM: Normal dorsi/plantarflexion.    Vibha was seen today for foot pain and weight loss.    Diagnoses and all orders for this visit:    Arch pain of right foot  Patient has high medial arches.  She tends to go barefoot or use flip flops otherwise.  Will refer her for foot orthotics.  Discussed that her sandals should have arch support as well.    Weight loss counseling, encounter for  1) Recommend increasing her protein to 6-8 ounces to maintain her glucose levels and decrease her hunger.  2) Consider [employer] for classes so that she can do 8-10 classes for cheap as she cannot afford a gym membership and then she can easily switch activities to find one that she can stick with.  3) Discussed interval training with her walks.

## 2021-06-20 ENCOUNTER — HEALTH MAINTENANCE LETTER (OUTPATIENT)
Age: 41
End: 2021-06-20

## 2021-06-20 NOTE — LETTER
Letter by Linda Winters MD at      Author: Linda Winters MD Service: -- Author Type: --    Filed:  Encounter Date: 12/16/2019 Status: Signed         [facility] FAMILY MEDICINE/OB  12/16/19    Patient: Vibha Hall  YOB: 1980  Medical Record Number: [id number]  CSN: [id number]                                                                              Non-opioid Controlled Substance Agreement    Alprazolam 0.5 mg PO qday prn for flight anxiety, dental anxiety, and rarely sleeping out of the home.  20 tablets/year.  I understand that my care provider has prescribed a controlled substance to help manage my condition(s). I am taking this medicine to help me function or work. I know this is strong medicine, and that it can cause serious side effects. Controlled substances can be sedating, addicting and may cause a dependency on the drug. They can affect my ability to drive or think, and cause depression. They need to be taken exactly as prescribed. Combining controlled substances with certain medicines or chemicals (such as cocaine, sedatives and tranquilizers, sleeping pills, meth) can be dangerous or even fatal. Also, if I stop controlled substances suddenly, I may have severe withdrawal symptoms.  If not helpful, I may be asked to stop them.    The risks, benefits, and side effects of these medicine(s) were explained to me. I agree that:    1. I will take part in other treatments as advised by my care team. This may be psychiatry or counseling, physical therapy, behavioral therapy, group treatment or a referral to a pain clinic. I will reduce or stop my medicine when my care team tells me to do so.  2. I will take my medicines as prescribed. I will not change the dose or schedule unless my care team tells me to. There will be no refills if I run out early.  I may be contactedwithout warning and asked to complete a urine drug test or pill count at any time.   3. I will keep all my appointments,  and understand this is part of the monitoring of controlled substances. My care team may require an office visit for EVERY controlled substance refill. If I miss appointments or dont follow instructions, my care team may stop my medicine.  4. I will not ask other providers to prescribe controlled substances, and I will not accept controlled substances from other people. If I need another prescribed controlled substance for a new reason, I will tell my care team within 1 business day.  5. I will use one pharmacy to fill all of my controlled substance prescriptions, and it is up to me to make sure that I do not run out of my medicines on weekends or holidays. If my care team is willing to refill my controlled substance prescription without a visit, I must request refills only during office hours, refills may take up to 3 days to process, and it may take up to 5 to 7 days for my medicine to be mailed and ready at my pharmacy. Prescriptions will not be mailed anywhere except my pharmacy.    6. I am responsible for my prescriptions. If the medicine/prescription is lost or stolen, it will not be replaced. I also agree not to share controlled substance medicines with anyone.          [facility] FAMILY MEDICINE/OB  12/16/19  Patient:  Vibha Hall  YOB: 1980  Medical Record Number: [id number]  CSN: [id number]    7. I agree to not use ANY illegal or recreational drugs. This includes marijuana, cocaine, bath salts or other drugs. I agree not to use alcohol unless my care team says I may. I agree to give urine samples whenever asked. If I dont give a urine sample, the care team may stop my medicine.    8. If I enroll in the [place] Medical Marijuana program, I will tell my care team. I will also sign an agreement to share my medical records with my care team.    9. I will bring in my list of medicines (or my medicine bottles) each time I come to the clinic.   10. I will tell my care team right away if  I become pregnant or have a new medical problem treated outside of my regular clinic.  11. I understand that this medicine can affect my thinking and judgment. It may be unsafe for me to drive, use machinery and do dangerous tasks. I will not do any of these things until I know how the medicine affects me. If my dose changes, I will wait to see how it affects me. I will contact my care team if I have concerns about medicine side effects.    I understand that if I do not follow any of the conditions above, my prescriptions or treatment may be stopped.      I agree that my provider, clinic care team, and pharmacy may work with any city, state or federal law enforcement agency that investigates the misuse, sale, or other diversion of my controlled medicine. I will allow my provider to discuss my care with or share a copy of this agreement with any other treating provider, pharmacy or emergency room where I receive care. I agree to give up (waive) any right of privacy or confidentiality with respect to these consents.   I have read this agreement and have asked questions about anything I did not understand.    ___________________________________________________________________________  Patient signature - Date/Time  -Vibha Hall                                      ___________________________________________________________________________  Witness signature                                                                    ___________________________________________________________________________  Provider signature- Linda Winters MD

## 2021-06-21 NOTE — LETTER
"Letter by Meg Burch, Genetic Counselor at      Author: Meg Burch, Genetic Counselor Service: -- Author Type: --    Filed:  Encounter Date: 11/16/2020 Status: (Other)         Linda Winters MD  [address] [facility] [address]                                  November 18, 2020    Patient: Vibha Hall   MR Number: [id number]   YOB: 1980   Date of Visit: 11/16/2020     Dear Dr. Singh MD:    Thank you for referring Vibha Hall to me for evaluation. Below are the relevant portions of my assessment and plan of care.    If you have questions, please do not hesitate to call me. I look forward to following Vibha along with you.    Sincerely,        Meg Burch Genetic Counselor          CC  No Recipients  Meg Burch, Genetic Counselor  11/18/2020  1:01 PM  Sign when Signing Visit  11/16/2020     Vibha Hall is a 39 y.o. female who is being evaluated via a billable telephone visit.      The patient has been notified of following:     \"This telephone visit will be conducted via a call between you and your physician/provider. We have found that certain health care needs can be provided without the need for a physical exam.  This service lets us provide the care you need with a short phone conversation.  If a prescription is necessary we can send it directly to your pharmacy.  If lab work is needed we can place an order for that and you can then stop by our lab to have the test done at a later time.    Telephone visits are billed at different rates depending on your insurance coverage. During this emergency period, for some insurers they may be billed the same as an in-person visit.  Please reach out to your insurance provider with any questions.    If during the course of the call the physician/provider feels a telephone visit is not appropriate, you will not be charged for this service.\"    Patient has given verbal consent to a Telephone visit? Yes    What phone number would you " like to be contacted at? [phone]    Patient would like to receive their AVS by AVS Preference: Dilma.    Referring Provider: Linda Winters MD    Present for Today's Visit: Vibha    Presenting Information:   I spoke with Vibha Hall over the phone today for genetic counseling to discuss her personal and family history of breast and prostate cancer.  She is here today to review this history, cancer screening recommendations, and available genetic testing options.    Personal History:  Vibha is a 39 y.o. female. She does not have any personal history of cancer.      She had her first menstrual period at age 10, her first child at age 29, and is premenopausal.  Vibha has her ovaries, fallopian tubes and uterus in place, and she has had no ovarian cancer screening to date.  She reports a 2-year history of oral contraceptive use in her late-teens and that she has never been on hormone replacement therapy.      Her most recent OB-GYN exam and Pap smear on 10/10/2016 were normal.  She had her first mammogram on 11/12/2020 that noted asymmetry in her right breast. She has a diagnostic right mammogram and ultrasound scheduled for 11/25/2020.  She has not yet had a colonoscopy.  She does not regularly do any other cancer screening at this time.  Vibha reported past tobacco use (from ages [age]), and up to about 6 drinks per week for alcohol use.    Family History: Vibha's family history os significant for the following (Please see scanned pedigree for detailed family history information)    Vibha has two daughters, ages 7 and 10, both reportedly healthy.     Vibha has three paternal half-sisters, ages 60-68; all reportedly healthy with no cancer histories.     Vibha has a paternal half-brother, age 66, who has a history of prostate cancer diagnosed in his early 60's. She does not believe it was particularly aggressive or metastatic.     Vibha's mother passed away at age 48 from metastatic breast cancer  diagnosed at age 47 or 48.    Vibha has a maternal uncle who passed in his early 70's with a history of an unknown type of cancer diagnosed in his mid 60's and HIV.     Vibha's mother has a maternal first-cousin with a history of breast cancer diagnosed in her 50's. This cousin has a grandson who was diagnosed with a leukemia at a young age (Vibha reports before age 8).     Vibha's maternal grandfather passed away in his 60's from an unknown type of cancer diagnosed at an unknown age.     Vibha's father, age 90, is reportedly healthy with no cancer history. Vibha does reports that he has had a number of precancerous skin lesions removed from his head.     Vibha has a paternal aunt who passed in her early 70's with a history of an unknown type of cancer. She was a smoker. This aunt has a daughter who was recently diagnosed with breast cancer in her 50's.     Vibha has a paternal uncle who passed away at an unknown age with a history of a tongue cancer. He was a smoker.     Her maternal ethnicity is [place] ([place]). Her paternal ethnicity is [place], [place], and [place].  There is no known Ashkenazi Anabaptist ancestry on either side of her family. There is no reported consanguinity.    Discussion:    Vibha's family history of breast cancer is suggestive of a hereditary cancer syndrome.    We reviewed the features of sporadic, familial, and hereditary cancers. In looking at Vibha's family history, it is possible that a cancer susceptibility gene is present due to her mother's early-onset breast cancer, and her maternal first-cousin's once removed breast cancer. She also has a brother with prostate cancer history and a paternal first-cousin with breast cancer history.     We discussed the natural history and genetics of hereditary breast cancers. A detailed handout regarding  the information we discussed will be sent to Vibha via [employer]. Topics included: inheritance pattern, cancer risks, cancer  screening recommendations, and also risks, benefits and limitations of testing.    We reviewed that the most common cause of hereditary breast cancer is Hereditary Breast and Ovarian Cancer (HBOC) syndrome, which is caused by mutations in the genes BRCA1 and BRCA2. BRCA1 and BRCA2 are two genes that increase the risk for breast and ovarian cancers, among others. Women who inherit a BRCA mutation have a 50 to 85% lifetime risk of breast cancer and up to a 40% lifetime risk of ovarian cancer. This is higher than the general population lifetime risks of 12% for breast cancer and less than 2% for ovarian cancer. Men with BRCA gene mutations have up to a 7% risk of breast cancer and 20% risk of prostate cancer. Other cancers, such as pancreatic cancer and melanoma, have also been associated with BRCA mutations.    Based on her family history, Vibha meets current National Comprehensive Cancer Network (NCCN) criteria for genetic testing of high-penetrance breast and/or ovarian cancer susceptibility genes.      We discussed that there are additional genes that could cause increased risk for breast cancer. As many of these genes present with overlapping features in a family and accurate cancer risk cannot always be established based upon the pedigree analysis alone, it would be reasonable for Vibha to consider panel genetic testing to analyze multiple genes at once.    We reviewed genetic testing options for hereditary breast and gynecologic cancers: actionable high/moderate breast and gynecologic cancer risk custom panel (CustomNext-Cancer, 19 genes, a combination of BRCANext +  STK11) and expanded high and moderate risk panel (BRCANext-Expanded, 23 genes). Vibha expressed an interest in learning as much information as possible from the testing. She opted for the BRCANext-Expanded panel.  Genetic testing is available for 23 genes associated with hereditary gynecologic, breast, and related cancers: BRCANext-Expanded (BERTHA,  BARD1, BRCA1, BRCA2, BRIP1, CDH1, CHEK2, DICER1, EPCAM, MLH1, MSH2, MSH6, NBN, NF1, PALB2, PMS2, PTEN, RAD51C, RAD51D, RECQL, SMARCA4, STK11, TP53).  We discussed that some of the genes in the BRCANext-Expanded panel are associated with specific hereditary cancer syndromes and published management guidelines: Hereditary Breast and Ovarian Cancer syndrome (BRCA1, BRCA2), Tran syndrome (MLH1, MSH2, MSH6, PMS2, EPCAM), Hereditary Diffuse Gastric Cancer (CDH1), Cowden syndrome (PTEN), Li Fraumeni syndrome (TP53), Peutz-Jeghers syndrome (STK11), and Neurofibromatosis type 1 (NF1).    Risk-reducing salpingo-oophorectomy can be considered in women with mutations in BRIP1, RAD51C, or RAD51D. Breast and/or other cancer risk management guidelines are available for BERTHA, CHEK2, PALB2, NF1, and NBN.  The remaining genes (BARD1, DICER1, RECQL, and SMARCA4) are associated with increased cancer risk and may allow us to make medical recommendations when mutations are identified.      Consent was obtained over the phone with no witness required due to the current covid19 global pandemic.    Medical Management: For Vibha, we reviewed that the information from genetic testing may determine:    additional cancer screening for which Vibha may qualify (i.e. mammogram and breast MRI, more frequent colonoscopies, more frequent dermatologic exams, etc.),    options for risk reducing surgeries Vibha could consider (i.e. bilateral mastectomy, surgery to remove her ovaries and/or uterus, etc.),      and targeted chemotherapies for Vibha if she were to develop certain cancers in the future (i.e. immunotherapy for individuals with Tran syndrome, PARP inhibitors, etc.).     These recommendations and possible targeted chemotherapies will be discussed in detail once genetic testing is completed.     I explained that Vibha will be contacted by our scheduling team to set up a lab appointment to get her blood drawn for genetic testing at either  [facility] cancer care lab or [facility] cancer care lab at her earliest convenience.     Plan:  1) Today Vibha elected to proceed with BRCANext-Expanded genetic testing (23 genes) through [facility].  2) This information should be available in approximately 3-4 weeks.  3) Vibha will be contacted by our scheduling department to set up a result disclosure appointment either in person or over the phone.     Time spent over the phone: 64 minutes    Meg Burch MS, [facility]  Licensed Genetic Counselor  [facility]  [phone]

## 2021-06-21 NOTE — LETTER
Letter by Meg Burch, Genetic Counselor at      Author: Meg Burch, Genetic Counselor Service: -- Author Type: --    Filed:  Encounter Date: 12/9/2020 Status: (Other)       [facility]    Negative Genetic Test Result    Genetic Testing  You had a blood test that looked at the genetic information in one or more genes associated with increased cancer risk.  The testing looked for any harmful changes that would stop this particular gene from working like it should. If an individual does not have any harmful changes or variants of unknown significance found from their blood test, their genetic test result is reported as negative.       Results  The genetic test did not identify any pathogenic (harmful) changes in the genes that were tested. There are several possible explanations for a negative test result. Without knowing the gene mutation in your family, the cause of the cancer in you or your relatives is still unknown. Your genetic counselor can help interpret the result for you and your relatives. In this case, there are several reasons that may explain the negative test result:    There may be a gene mutation in the family that you did not inherit.     You may have a gene mutation in a different gene that was not included in the test, or has not yet been discovered.     The cancers in you or your family may be due to a combination of genetic factors and environment (multifactorial/familial).    The cancers in you or your family may be sporadic/random cancers.    There is very small chance that a mutation was not found by current testing methods.  As testing technology evolves over time, it may still be possible to identify a mutation in a gene that was not found on this test.    It is important to note which genes were included in your test. A list of these genes can be found on your test result.    Screening Recommendations  Due to this negative test result, cancer screening  recommendations should be based on your personal and family history. This may include increased cancer screening for you and/or your family members. Your genetic counselor and health care provider can help make appropriate recommendations.      Please call us if you have any questions or concerns.   [facility] Meg Burch MS, [facility]  [phone]

## 2021-06-21 NOTE — PROGRESS NOTES
[facility] Daily Progress     Patient Name: Vibha Hall  Date: 11/27/2018  Visit #: 2  Referral Diagnosis: finger pain  Referring provider: Linda Winters MD  Visit Diagnosis: No diagnosis found.    Assessment:     Patient is appropriate to continue with skilled occupational therapy intervention, as indicated by initial plan of care.    Goal Status:  Patient will be independent with home exercise program in: 2 weeks  Patient will be able to: lift;for housework;for grocery shopping;with no pain;with no difficulty;in 12 weeks  Patient will be able to  & pinch: for meal prep;for household chores;with no pain;in 12 weeks  Patient will improve hand/finger coordination for: fasteners;meal prep;with no pain;in 12 weeks      Plan / Patient Education:     Continue with initial plan of care.  Progress with home program as tolerated.    Subjective:     Pain rating at rest: 0  Pain rating with activity: 2      Objective:       LEFT index finger AROM  (flex/extend)  11-13-18  (flex/extend)  (flex/extend)  (flex/extend)  (flex/extend)    (flex/extend)   MCP 64/0         PIP 66/-14         DIP 20/0                Treatment Today: Patient instructed on isolated stretch to MCP and PIP in flexion followed by a composite stretch in flexion.  TREATMENT MINUTES COMMENTS   Evaluation     Self-care/ Home management     Manual therapy     Neuromuscular Re-education     Therapeutic Exercises 24    Iontophoresis     Orthotic Fitting     Total 24    Blank areas are intentional and mean the treatment did not include these items.       Klarissa Mccarthy  11/27/2018  7:06 AM

## 2021-06-21 NOTE — PROGRESS NOTES
Upper Extremity Initial Evaluation    Patient Name: Vibha Hall  Date of evaluation: 11/13/2018  Referral Diagnosis: finger injury  Referring provider: Linda Winters MD  Visit Diagnosis:     ICD-10-CM    1. Pain of finger of left hand M79.645    2. Weakness of left hand R29.898    3. Decreased activities of daily living (ADL) R68.89        Assessment:      Pt. is appropriate for skilled OT intervention as outlined in the Plan of Care (POC).    Goals:  Patient will be independent with home exercise program in: 2 weeks  Patient will be able to: lift;for housework;for grocery shopping;with no pain;with no difficulty;in 12 weeks  Patient will be able to  & pinch: for meal prep;for household chores;with no pain;in 12 weeks  Patient will improve hand/finger coordination for: fasteners;meal prep;with no pain;in 12 weeks      Patient's expectations/goals are realistic.    Barriers to Learning or Achieving Goals:  No Barriers.       Plan / Patient Instructions:      Plan of Care:   Communication with: Referral Source  Patient Related Instruction: Nature of Condition;Treatment plan and rationale;Basis of treatment;Expected outcome  Times per Week: 1  Number of Weeks: 12  Number of Visits: 12  Therapeutic Exercise: Stretching;ROM;Strengthening  Neuromuscular Reeducation: kinesio tape  Manual Therapy: soft tissue mobilization  Functional Training (ADL's): ergonomics;self care  Orthotic Fitting: OTC;custom      Plan for next visit: consider flexion spring splint. Progress to isolated PROM to finger joints.     Subjective:        Social information:   Occupation:    Work Status:Working part time     History of Present Illness:    Vibha is a 37 y.o. female who presents to therapy today with complaints of left index finger pain and stiffness. Date of onset/duration of symptoms is September 2018 when she jammed her finger on a boulder. Onset was sudden. Symptoms are not improving. She reports no  history of similar symptoms. She describes their previous level of function as not limited. Functional limitations are described as occurring with gripping, pinching, lifting and carrying for ADL's and IADL's.     Pain rating at rest: 0  Pain rating with activity: 7       Objective:      Note: Items left blank indicates the item was not performed or not indicated at the time of the evaluation.     Patient Outcome Measures: QuickDASH Score: 9.1      Upper Extremity Examination:  1. Pain of finger of left hand     2. Weakness of left hand     3. Decreased activities of daily living (ADL)       Precautions/Restrictions: None  Involved side: Left  Atrophy:  Absent  Color: Normal  Temperature: Normal  Edema: Moderate  Palpation: entire index finger  Scar: NA  Guarded extremity: Minimal.  Sensory: Patient reports normal.      Coordination  Right   Left     NT WNL Impaired NT WNL Impaired   Gross Motor  x   x    Fine Motor  x    x   9 hole peg x   x       Hand AROM  Right   Left     NT WNL Impaired NT WNL Impaired   Full Fist  x    x   Flat Fist  x    x   Claw Fist  x    x     ROM/STRENGTH RIGHT LEFT   Note: * indicates pain AROM AROM   Shoulder Flexion - 180? WNL WNL   Shoulder Ext - 60?  WNL WNL   Shoulder Abd - 180? WNL WNL   Elbow Flexion - 150? WNL WNL   Elbow Extension - 0?    WNL WNL   Forearm Supination - 80? WNL WNL   Forearm Pronation - 80? WNL WNL   Wrist Flexion - 65-80?  WNL WNL   Wrist Extension - 65-80? WNL WNL   Ulnar Deviation - 20-35?     Radial Deviation - 10-20?      Strength  60# 36#   3 Point Pinch 17# 8#   Lateral Pinch 18# 13#       LEFT index finger AROM (flex/extend)  11-13-18 (flex/extend)   MCP 64/0    PIP 66/-14    DIP 20/0        May benefit from PT evaluation: No    U/E orthosis currently:   No    Treatment Today: Patient instructed on PROM to left index finger-Composite finger flexion with emphasis on PIP and isolated PIP extension. Instructed patient to wear a finger sleeve to reduce  the swelling in the finger. Patient will wear at night and as needed during the day.  TREATMENT MINUTES COMMENTS   Evaluation 25    Self-care/ Home management     Manual therapy     Neuromuscular Re-education     Orthotic fitting     Therapeutic Exercises 24    Iontophoresis           Total 49    Blank areas are intentional and mean the treatment did not include these items.     GOALS AND PLAN OF CARE WERE ESTABLISHED IN COOPERATION WITH THE PATIENT    OT Evaluation Code: (Please list factors)   Comorbidities: None  Profile/History Review: Brief    Need for eval modification: No    # Treatment options: Limited    Clinical Decision Making:  Low      Occupational Profile/ Medical and Therapy History and Comorbidities Occupational Performance Clinical Decision Making   (Complexity)   brief history with review of medical/therapy records related to the presenting problem.  No comorbidities 1-3 Performance deficits that result in activity limitations and/or participation restrictions.    No Assessment Modification  Low complexity, which includes  problem-focused assessments, and consideration of a limited number of treatment options.      expanded review of medical/therapy records and additional review of physical, cognitive and psychosocial history.    May have comorbidities 3-5 Performance deficits that result in activity limitations and/or participation restrictions.    Minimal to moderate modification of assessment Moderate complexity, which includes analysis of data from detailed assessments, and consideration of several treatment options.         Review of medical/therapy records and extensive additional review of physical, cognitive and psychosocial history.  Comorbidities affect occupational performance 5 or more Performance deficits that result in activity limitations and/or participation restrictions.    Significant modification of assessment High complexity, analysis of  Occupational profile and data,   Comprehensive assessments, multiple treatment options.            Klarissa Mccarthy  11/13/2018  7:04 AM

## 2021-06-21 NOTE — LETTER
Letter by Meg Burch, Genetic Counselor at      Author: Meg Burch, Genetic Counselor Service: -- Author Type: --    Filed:  Encounter Date: 12/9/2020 Status: (Other)         Linda Winters MD  [address] [facility] [address]                                  December 9, 2020    Patient: Vibha Hall   MR Number: [id number]   YOB: 1980   Date of Visit: 12/9/2020     Dear Dr. Singh MD:    Thank you for referring Vibha Hall to me for evaluation. Below are the relevant portions of my assessment and plan of care.    If you have questions, please do not hesitate to call me. I look forward to following Vibha along with you.    Sincerely,        Meg Burch Genetic Counselor          CC  No Recipients  Meg Burch, Genetic Counselor  12/9/2020  8:16 AM  Sign when Signing Visit  12/9/2020    Referring Provider: Dr. Linda Winters    Presenting Information:  I spoke to Vibha by phone today to discuss her genetic testing results. Her blood was drawn on 11/18/2020. BRCANext-Expanded testing was ordered  from [email or web address]. This testing was done because of Vibha's family history of breast and prostate cancer.    Genetic Testing Result: NEGATIVE  Vibha is negative for mutations in the BERTHA, BARD1, BRCA1, BRCA2, BRIP1, CDH1, CHEK2, DICER1, EPCAM, MLH1, MSH2, MSH6, NBN, NF1, PALB2, PMS2, PTEN, RAD51C, RAD51D, RECQL, SMARCA4, STK11, and TP53 genes. No mutations were found in any of the 23 genes analyzed. This test involved sequencing and deletion/duplication analysis of all genes with the exceptions of EPCAM (deletions/duplications only).    Testing did not detect an identifiable mutation associated with Hereditary Breast and Ovarian Cancer syndrome (BRCA1, BRCA2), Tran syndrome (MLH1, MSH2, MSH6, PMS2, EPCAM), Hereditary Diffuse Gastric Cancer (CDH1), Cowden syndrome (PTEN), Li Fraumeni syndrome (TP53), Peutz-Jeghers syndrome (STK11), or Neurofibromatosis type 1 (NF1).    A copy of the test  "report can be found in the Media tab and named \"Genetics Scan-[facility]\". The report is scanned in as a linked document.    Interpretation:  We discussed several different interpretations of this negative test result.    1. One explanation may be that there is a different gene or combination of genes and environment that are associated with the cancers in Vibha and/or her relatives.    2. It is possible that her mother or other relatives with breast cancer history did have a mutation in one of the genes Vibha was tested for, and Vibha did not inherit it.  3. There is also a small possibility that there is a mutation in one of these genes, and we could not find it with our current testing methods.       Screening:  Based on this negative test result, it is important for Vibha and her relatives to refer back to the family history for appropriate cancer screening.      Based on her personal and family history, Vibha has a 18.5% lifetime risk of developing breast cancer based on the MAREK (v8) model.  Therefore, Vibha does not meet current National Comprehensive Cancer Network (NCCN) guidelines for high risk breast screening, which is offered to women with a 20% lifetime risk or higher. However, it is still important for Vibha to continue with routine breast screening under the care of her physicians. Breast cancer screening is generally recommended to begin approximately 10 years younger than the earliest age of breast cancer diagnosis in the family, or at age 40, whichever comes first.  In this family, screening may begin at age 37.  Vibha is encouraged to discuss breast screening with her physicians.     Other population cancer screening options, such as those recommended by the American Cancer Society and the National Comprehensive Cancer Network (NCCN), are also appropriate for Vibha and her family. These screening recommendations may change if there are changes to Vibha's personal and/or family history. Final " screening recommendations should be made by each individual's managing physician.      Inheritance:  We reviewed the autosomal dominant inheritance of mutations in these 23.  We discussed that Vibha cannot/did not pass on an identifiable mutation in these genes to her children based on this test result.  Mutations in these genes do not skip generations.      Additional Testing Considerations:  No further genetic is recommended for Vibha or her family at this time. Vibha was encouraged to contact me should her personal or family history change as this may change genetic testing recommendations.     Summary:  We do not have an explanation for Vibha's family history of cancer.  Because of that, it is important that she continue with cancer screening based on her personal and family history as discussed above.    Genetic testing is rapidly advancing, and new cancer susceptibility genes will most likely be identified in the future.  Therefore, I encouraged Vibha to contact me annually or if there are changes in her personal or family history.  This may change how we assess her cancer risk, screening, and the testing we would offer.    Plan:  1. A copy of the test results will be mailed to Vibha.  2. She plans to follow-up with her primary care providers for ongoing care.  3. She should contact me annually, or sooner if her family history changes.    If Vibha has any further questions, I encouraged her to contact me at [phone].    Time spent on the phone: 7 minutes.    Meg Burch MS, [facility]  Licensed Genetic Counselor  [facility]  [phone]

## 2021-06-21 NOTE — PROGRESS NOTES
Patient is a 38 yo female who presents today with left pointer finger pain.    Patient notes that she was camping with the family about 5-6 weeks ago.  She got up in the night to go to the bathroom and accidentally hit her forefinger on a large boulder.  Initially it hurt and was a little swollen, but now the pain is still there and she cannot flex it very much at the PIP.  She was not able to ice it as they were camping.  She notes pain with any pressure at the tip.      Objective   BP 98/66 (Patient Site: Right Arm, Patient Position: Sitting, Cuff Size: Adult Regular)   Pulse 71   Wt 189 lb 8 oz (86 kg)   LMP 10/22/2018   SpO2 99%   Breastfeeding? No   BMI 30.36 kg/m    General appearance: alert, appears stated age and cooperative  Extremities: left pointer finger   Inspection: generalized swelling at the PIP joint.  No erythema or bruising.  Palpation:  Diffuse aching at the PIP, but no point tenderness.  ROM: Decreased flexion at the PIP joint.  Circulation: cap refill < 3 seconds.  Neuro: Normal 2 point sensation.    Xray:  I personally interpreted this xray and discussed with the patient that there was no acute fracture or dislocation noted on the Xray.    Vibha was seen today for finger injury.    Diagnoses and all orders for this visit:    Finger injury  -     XR Finger Left 2 or More VWS; Future  -     Ambulatory referral to PT/OT  Likely a soft tissue injury.  Discussed use of NSAIDS.  Due to lack of ROM I recommended Physical therapy.  Patient to notify if symptoms worsen or do not improve.

## 2021-06-22 NOTE — PROGRESS NOTES
[facility] Discharge Summary  Patient Name: Vibha Hall  Date: 3/5/2019  Referral Diagnosis: finger injury  Referring provider: Linda Winters MD  Visit Diagnosis:   1. Pain of finger of left hand     2. Weakness of left hand     3. Decreased activities of daily living (ADL)         Goal status: progressing toward  Patient will be independent with home exercise program in: 2 weeks  Patient will be able to: lift;for housework;for grocery shopping;with no pain;with no difficulty;in 12 weeks  Patient will be able to  & pinch: for meal prep;for household chores;with no pain;in 12 weeks  Patient will improve hand/finger coordination for: fasteners;meal prep;with no pain;in 12 weeks      Patient was seen for 4 visits between 11-13-18 and 12-27-18.    Therapy will be discontinued at this time.  The patient will need a new referral to resume.    Thank you for your referral.  Klarissa Mccarthy  3/5/2019   12:28 PM    [facility] Daily Progress     Patient Name: Vibha Hall  Date: 12/27/2018  Visit #: 4  Referral Diagnosis: finger pain  Referring provider: Linda Winters MD  Visit Diagnosis:     ICD-10-CM    1. Pain of finger of left hand M79.645    2. Weakness of left hand R29.898    3. Decreased activities of daily living (ADL) R68.89        Assessment:     Patient is appropriate to continue with skilled occupational therapy intervention, as indicated by initial plan of care.    Goal Status:  Patient will be independent with home exercise program in: 2 weeks  Patient will be able to: lift;for housework;for grocery shopping;with no pain;with no difficulty;in 12 weeks  Patient will be able to  & pinch: for meal prep;for household chores;with no pain;in 12 weeks  Patient will improve hand/finger coordination for: fasteners;meal prep;with no pain;in 12 weeks      Plan / Patient Education:     Continue with initial plan of care.  Progress with home program as tolerated.    Subjective:      Pain rating at rest: 0  Pain rating with activity: 0      Objective:       RIGHT index (flex/extend)   MCP 84/0   /0   DIP 79/0     LEFT index finger AROM  (flex/extend)  11-13-18  (flex/extend)  12-6-18  (flex/extend)  12-27-18  (flex/extend)  (flex/extend)    (flex/extend)   MCP 64/0 74/0 74/0       PIP 66/-14 81/-8 81/-4       DIP 20/0 54/0 59/0              Treatment Today: Reviewed HEP. Refined stretches to each joint to avoid substitutions. If no significant change in measurements next visit, recommend ortho consult.  TREATMENT MINUTES COMMENTS   Evaluation     Self-care/ Home management     Manual therapy     Neuromuscular Re-education     Therapeutic Exercises 21    Iontophoresis     Orthotic Fitting     Total 21    Blank areas are intentional and mean the treatment did not include these items.       Klarissa Mccarthy  12/27/2018  1:05-1:26 PM

## 2021-06-22 NOTE — PROGRESS NOTES
[facility] Daily Progress     Patient Name: Vibha Hall  Date: 12/6/2018  Visit #: 3  Referral Diagnosis: finger pain  Referring provider: Linda Winters MD  Visit Diagnosis:     ICD-10-CM    1. Pain of finger of left hand M79.645    2. Weakness of left hand R29.898    3. Decreased activities of daily living (ADL) R68.89        Assessment:     Patient is appropriate to continue with skilled occupational therapy intervention, as indicated by initial plan of care.    Goal Status:  Patient will be independent with home exercise program in: 2 weeks  Patient will be able to: lift;for housework;for grocery shopping;with no pain;with no difficulty;in 12 weeks  Patient will be able to  & pinch: for meal prep;for household chores;with no pain;in 12 weeks  Patient will improve hand/finger coordination for: fasteners;meal prep;with no pain;in 12 weeks      Plan / Patient Education:     Continue with initial plan of care.  Progress with home program as tolerated.    Subjective:     Pain rating at rest: 0  Pain rating with activity: 2      Objective:       LEFT index finger AROM  (flex/extend)  11-13-18  (flex/extend)  (flex/extend)  (flex/extend)  (flex/extend)    (flex/extend)   MCP 64/0 74/0        PIP 66/-14 81/-8        DIP 20/0 54/0               Treatment Today: Reviewed HEP including isolated stretch to MCP and PIP in flexion followed by a composite stretch in flexion. Patient will now start with AROM and add overpressure to passively stretch to end range. Added composite finger flexion strap for patient to use 30 minutes in the evening. Also instructed on finger PIP/DIP flexion strap to use to maintain finger flexion while sitting idle for short periods.  TREATMENT MINUTES COMMENTS   Evaluation     Self-care/ Home management 8    Manual therapy     Neuromuscular Re-education     Therapeutic Exercises 12    Iontophoresis     Orthotic Fitting     Total 20    Blank areas are intentional and mean the  treatment did not include these items.       Klarissa Mccarthy  12/6/2018  1:00 PM

## 2021-07-03 NOTE — ADDENDUM NOTE
Addendum Note by Jaylin Winters MD at 10/28/2020  6:49 PM     Author: Jaylin Winters MD Service: -- Author Type: Physician    Filed: 10/28/2020  6:49 PM Encounter Date: 10/19/2020 Status: Signed    : Jaylin Winters MD (Physician)    Addended by: JAYLIN WINTERS on: 10/28/2020 06:49 PM        Modules accepted: Orders

## 2021-07-19 ENCOUNTER — OFFICE VISIT (OUTPATIENT)
Dept: FAMILY MEDICINE | Facility: CLINIC | Age: 41
End: 2021-07-19
Payer: COMMERCIAL

## 2021-07-19 VITALS
WEIGHT: 178.38 LBS | DIASTOLIC BLOOD PRESSURE: 68 MMHG | OXYGEN SATURATION: 99 % | SYSTOLIC BLOOD PRESSURE: 100 MMHG | BODY MASS INDEX: 28.36 KG/M2 | HEART RATE: 70 BPM

## 2021-07-19 DIAGNOSIS — R53.81 MALAISE: Primary | ICD-10-CM

## 2021-07-19 LAB — TSH SERPL DL<=0.005 MIU/L-ACNC: 0.47 UIU/ML (ref 0.3–5)

## 2021-07-19 PROCEDURE — 84443 ASSAY THYROID STIM HORMONE: CPT | Performed by: FAMILY MEDICINE

## 2021-07-19 PROCEDURE — 36415 COLL VENOUS BLD VENIPUNCTURE: CPT | Performed by: FAMILY MEDICINE

## 2021-07-19 PROCEDURE — 99213 OFFICE O/P EST LOW 20 MIN: CPT | Performed by: FAMILY MEDICINE

## 2021-07-19 ASSESSMENT — ANXIETY QUESTIONNAIRES
7. FEELING AFRAID AS IF SOMETHING AWFUL MIGHT HAPPEN: SEVERAL DAYS
5. BEING SO RESTLESS THAT IT IS HARD TO SIT STILL: NOT AT ALL
6. BECOMING EASILY ANNOYED OR IRRITABLE: NEARLY EVERY DAY
IF YOU CHECKED OFF ANY PROBLEMS ON THIS QUESTIONNAIRE, HOW DIFFICULT HAVE THESE PROBLEMS MADE IT FOR YOU TO DO YOUR WORK, TAKE CARE OF THINGS AT HOME, OR GET ALONG WITH OTHER PEOPLE: SOMEWHAT DIFFICULT
2. NOT BEING ABLE TO STOP OR CONTROL WORRYING: SEVERAL DAYS
GAD7 TOTAL SCORE: 10
3. WORRYING TOO MUCH ABOUT DIFFERENT THINGS: MORE THAN HALF THE DAYS
1. FEELING NERVOUS, ANXIOUS, OR ON EDGE: SEVERAL DAYS

## 2021-07-19 ASSESSMENT — PATIENT HEALTH QUESTIONNAIRE - PHQ9
5. POOR APPETITE OR OVEREATING: MORE THAN HALF THE DAYS
SUM OF ALL RESPONSES TO PHQ QUESTIONS 1-9: 18

## 2021-07-19 NOTE — PROGRESS NOTES
Assessment & Plan     Malaise  - TSH with free T4 reflex  Patient is having difficulty with poor motivation during this pandemic.  She has not felt the motivation to do the things that she would normally enjoy.  She is finding it difficult to find the energy to get started.  We discussed venlafaxine versus sertraline versus continue with therapy.  Patient is looking for a new therapist.  She will think about it and let me know.    Addendum: Patient decided that she would like to proceed with venlafaxine.    Return in about 4 weeks (around 8/16/2021).    Linda Winters MD  [facility]      Mushtaq Dunne is a 40 year old who presents for the following health issues:    HPI Feeling low energy.  Does not want to be with people.  Went to book club, did not give her energy like it used to.  Feels tired all the time.  Wants to go to bed right now.  Hard to feel motivation.  Ruiz's sister will watch her kids and will give them a weekend away from the kids.  Last year, was eating really healthy.  The kids have decided they are vegetarian.  Does not want a medication that causes her to gain weight.  Has a membership to the [employer], but it has been difficult to find the energy to go.  Meeting with therapist, but looking into a new therapist.  Drinking more initially, but now cutting back.  She knows the things she needs to do to feel better, but has no motivation to proceed.    Fluoxetine made her feel flat.  Wellbutrin added to that gave her panic attacks.     Patient Active Problem List    Diagnosis Date Noted     Pregnancy      Priority: Medium     Created by Conversion         Sciatica      Priority: Medium     Created by Conversion         Gynecologic Services Intrauterine Device (IUD) Insertion      Priority: Medium     Created by Conversion         Gynecologic Services Intrauterine Device (IUD) Checking      Priority: Medium     Created by Conversion         Current Outpatient Medications    Medication Instructions     ALPRAZolam (XANAX) 0.5 MG tablet [ALPRAZOLAM (XANAX) 0.5 MG TABLET] Take one tablet as needed for flight anxiety or before bedtime.     INTRAUTERINE DEVICE, IUD, UTRN [INTRAUTERINE DEVICE, IUD, UTRN] Paraguard.     venlafaxine (EFFEXOR-ER) 37.5 mg, Oral, DAILY           Objective    /68 (BP Location: Left arm, Patient Position: Sitting, Cuff Size: Adult Regular)   Pulse 70   Wt 80.9 kg (178 lb 6 oz)   SpO2 99%   BMI 28.36 kg/m    Body mass index is 28.36 kg/m .  Physical Exam   Gen: Patient is alert and oriented.  Psych Exam:  Behavior:normal    Mood:depressed   Affect:normal   Eye Contact:normal   Thought Content: normal   Insight:normal    Judgement: normal      PHQ 7/19/2021   PHQ-9 Total Score 18   Q9: Thoughts of better off dead/self-harm past 2 weeks Several days

## 2021-07-20 ENCOUNTER — TELEPHONE (OUTPATIENT)
Dept: FAMILY MEDICINE | Facility: CLINIC | Age: 41
End: 2021-07-20

## 2021-07-20 DIAGNOSIS — R53.81 MALAISE: Primary | ICD-10-CM

## 2021-07-20 RX ORDER — VENLAFAXINE HYDROCHLORIDE 37.5 MG/1
37.5 TABLET, EXTENDED RELEASE ORAL DAILY
Qty: 30 TABLET | Refills: 1 | Status: SHIPPED | OUTPATIENT
Start: 2021-07-20 | End: 2021-09-20

## 2021-07-20 ASSESSMENT — ANXIETY QUESTIONNAIRES: GAD7 TOTAL SCORE: 10

## 2021-07-20 NOTE — TELEPHONE ENCOUNTER
Reason for Call:  Other call back    Detailed comments: Pt calling stating that Dr Winters offered her a medication (Effexor), at the time patient was not sure if she wanted to take that, but calling in today to ask the Dr Winters please send rx to pharmacy    Pharm:  [facility]     Phone Number Patient can be reached at: Cell number on file:    Telephone Information:   Mobile [phone]       Best Time: anytime    Can we leave a detailed message on this number? YES    Call taken on 7/20/2021 at 4:08 PM by Radha Amin

## 2021-07-20 NOTE — TELEPHONE ENCOUNTER
Dr Winters,    If you are stiil ok with sending this in for her?  I did get the pharmacy in her chart    Aby Garcia CMA ([facility])

## 2021-07-21 NOTE — TELEPHONE ENCOUNTER
Patient sent a CRAiLAR message stating that she has already picked up the medication and if she has any issues she will let us know.    GF/RMA

## 2021-09-18 DIAGNOSIS — R53.81 MALAISE: ICD-10-CM

## 2021-09-19 NOTE — TELEPHONE ENCOUNTER
"Routing refill request to provider for review/approval because:  Labs not current:  Creatinine     Last Written Prescription Date:  7/20/21  Last Fill Quantity: 30,  # refills: 1   Last office visit provider:  7/19/21     Requested Prescriptions   Pending Prescriptions Disp Refills     venlafaxine (EFFEXOR-XR) 37.5 MG 24 hr capsule [Pharmacy Med Name: VENLAFAXINE HCL ER 37.5 ER CAPSULE ER 24HR] 30 capsule 1     Sig: TAKE ONE (1) CAPSULE (37.5 MG) BY MOUTH DAILY       Serotonin-Norepinephrine Reuptake Inhibitors  Failed - 9/18/2021 12:06 PM        Failed - Normal serum creatinine on file in past 12 months     No lab results found.    Ok to refill medication if creatinine is low          Passed - Blood pressure under 140/90 in past 12 months     BP Readings from Last 3 Encounters:   07/19/21 100/68   12/16/19 100/70                 Passed - Recent (12 mo) or future (30 days) visit within the authorizing provider's specialty     Patient has had an office visit with the authorizing provider or a provider within the authorizing providers department within the previous 12 mos or has a future within next 30 days. See \"Patient Info\" tab in inbasket, or \"Choose Columns\" in Meds & Orders section of the refill encounter.              Passed - Medication is active on med list        Passed - Patient is age 18 or older        Passed - No active pregnancy on record        Passed - No positive pregnancy test in past 12 months             Julio C Hemphill RN 09/19/21 12:49 PM  "

## 2021-09-20 RX ORDER — VENLAFAXINE HYDROCHLORIDE 37.5 MG/1
CAPSULE, EXTENDED RELEASE ORAL
Qty: 30 CAPSULE | Refills: 1 | Status: SHIPPED | OUTPATIENT
Start: 2021-09-20 | End: 2022-01-03

## 2021-09-20 NOTE — TELEPHONE ENCOUNTER
Please schedule patient for a telephone/video visit for medication check on venlafaxine with PHQ9/GAD7

## 2021-10-10 ENCOUNTER — HEALTH MAINTENANCE LETTER (OUTPATIENT)
Age: 41
End: 2021-10-10

## 2021-10-11 ENCOUNTER — VIRTUAL VISIT (OUTPATIENT)
Dept: FAMILY MEDICINE | Facility: CLINIC | Age: 41
End: 2021-10-11
Payer: COMMERCIAL

## 2021-10-11 DIAGNOSIS — R53.81 MALAISE: Primary | ICD-10-CM

## 2021-10-11 PROCEDURE — 99213 OFFICE O/P EST LOW 20 MIN: CPT | Mod: 95 | Performed by: FAMILY MEDICINE

## 2021-10-11 RX ORDER — VENLAFAXINE HYDROCHLORIDE 75 MG/1
75 CAPSULE, EXTENDED RELEASE ORAL DAILY
Qty: 90 CAPSULE | Refills: 1 | Status: SHIPPED | OUTPATIENT
Start: 2021-10-11 | End: 2023-09-28

## 2021-10-11 NOTE — PROGRESS NOTES
"Amalia is a 40 year old who is being evaluated via a billable video visit.      How would you like to obtain your AVS? MyChart  If the video visit is dropped, the invitation should be resent by: Text to cell phone: [phone]  Will anyone else be joining your video visit? No      Video Start Time: 7:13 AM        Assessment & Plan     Vibha was seen today for medication follow-up.    Diagnoses and all orders for this visit:    Malaise  -     venlafaxine (EFFEXOR-XR) 75 MG 24 hr capsule; Take 1 capsule (75 mg) by mouth daily    Patient has seen some benefit on starting the venlafaxine with regards to her energy levels.  She is starting to do more on a daily basis, and she has less despair regarding her energy.  There have been significant improvements from her PHQ-9 score.  We discussed the option of either increasing the dose to 75 mg as a staying at her current dose.  Patient is okay increasing the dose to 75 mg.  She notes she will start the next dose after she finishes her current 37.5 mg dose.  Recommend follow-up 4 weeks after starting on her new dose.  Discussed signs and symptoms for which to notify the clinic.      Return in about 4 weeks (around 11/8/2021).    Linda Winters MD  [facility]    Subjective   Amalia is a 40 year old who presents for the following health issues: Follow up malaise:    HPI     Patient has had difficulty with low energy.  Not sure if it is the pandemic.  The low energy is so atypical for her that it is sparking a secondary depression.  She was started on Effexor XR 37.5 mg:  She has been taking it for 2.5 months.  Noticing a few flutters in her heart.  Feels like an \"off beat\" in the past two weeks.  Has a feeling where she starts to feel anxious for a second and it goes away.  Still feeling tired all the time.  She definitely has more energy than when she was seen in clinic.  She is cooking more, spending more time in the garden.  Patient is not moving " at much as she would like.  Sleep is great.           Objective       Gen:  Patient is alert and oriented, NAD.  Psych Exam:  Behavior:normal    Mood:pleaseant, upbeat   Affect:normal   Eye Contact:normal   Thought Content: normal   Insight:normal    Judgement: normal        Over the last two weeks, how often have you been bothered by any the following symptoms?  Please use the following scale;  0 = Not at all  1 = Several days but less than half  2 = More than half of the days  3 = Nearly every day    1) Little interest or pleasure in doing things [ 2 ]  2) Feeling down, depressed, or hopeless.[ 2 ]  3) Trouble falling or staying asleep, or sleeping too much [ 0   ]  4) Feeling tired or having little energy.[ 3 ]  5) Poor appetite or overeating [  2  ]  6) Feeling bad about yourself - or that you are a failure or have let yourself or your family down [ 2 ]  7) Trouble concentrating on things, such as reading the newspaper or watching television [  1  ]  8) Moving or speaking so slowly that other people could have noticed. Or the opposite-being fidgety or restless that you have been moving around a lot more than usual [ 0  ]  9) Thoughts that you would be better off dead, or of hurting yourself in some way [ 1 ]    Finally, how difficult have these problems made it for you to do your work, take care of things at home, or get along with other people?  Not difficult at all, somewhat difficult, very difficult or extremely difficult? Somewhat difficult.    PHQ score = 13.      Vitals:  No vitals were obtained today due to virtual visit.                    Video-Visit Details    Type of service:  Video Visit    Video End Time:7:38 AM    Originating Location (pt. Location): Home    Distant Location (provider location):  [facility]     Platform used for Video Visit: [employer]

## 2021-12-02 ENCOUNTER — LAB REQUISITION (OUTPATIENT)
Dept: LAB | Facility: CLINIC | Age: 41
End: 2021-12-02
Payer: COMMERCIAL

## 2021-12-02 DIAGNOSIS — Z20.828 CONTACT WITH AND (SUSPECTED) EXPOSURE TO OTHER VIRAL COMMUNICABLE DISEASES: ICD-10-CM

## 2021-12-02 PROCEDURE — U0005 INFEC AGEN DETEC AMPLI PROBE: HCPCS | Mod: ORL | Performed by: FAMILY MEDICINE

## 2021-12-03 LAB — SARS-COV-2 RNA RESP QL NAA+PROBE: NEGATIVE

## 2022-01-03 ENCOUNTER — OFFICE VISIT (OUTPATIENT)
Dept: FAMILY MEDICINE | Facility: CLINIC | Age: 42
End: 2022-01-03
Payer: COMMERCIAL

## 2022-01-03 VITALS
HEART RATE: 84 BPM | BODY MASS INDEX: 28.25 KG/M2 | HEIGHT: 67 IN | SYSTOLIC BLOOD PRESSURE: 110 MMHG | WEIGHT: 180 LBS | OXYGEN SATURATION: 99 % | DIASTOLIC BLOOD PRESSURE: 58 MMHG

## 2022-01-03 DIAGNOSIS — R82.90 ABNORMAL URINE ODOR: ICD-10-CM

## 2022-01-03 DIAGNOSIS — L71.0 PERIORAL DERMATITIS: ICD-10-CM

## 2022-01-03 DIAGNOSIS — Z13.220 LIPID SCREENING: ICD-10-CM

## 2022-01-03 DIAGNOSIS — Z00.00 ROUTINE GENERAL MEDICAL EXAMINATION AT A HEALTH CARE FACILITY: Primary | ICD-10-CM

## 2022-01-03 DIAGNOSIS — Z13.1 SCREENING FOR DIABETES MELLITUS: ICD-10-CM

## 2022-01-03 LAB
ALBUMIN UR-MCNC: NEGATIVE MG/DL
APPEARANCE UR: CLEAR
BACTERIA #/AREA URNS HPF: ABNORMAL /HPF
BILIRUB UR QL STRIP: NEGATIVE
CHOLEST SERPL-MCNC: 245 MG/DL
COLOR UR AUTO: YELLOW
FASTING STATUS PATIENT QL REPORTED: YES
FASTING STATUS PATIENT QL REPORTED: YES
GLUCOSE BLD-MCNC: 89 MG/DL (ref 70–125)
GLUCOSE UR STRIP-MCNC: NEGATIVE MG/DL
HDLC SERPL-MCNC: 65 MG/DL
HGB UR QL STRIP: NEGATIVE
KETONES UR STRIP-MCNC: NEGATIVE MG/DL
LDLC SERPL CALC-MCNC: 151 MG/DL
LEUKOCYTE ESTERASE UR QL STRIP: NEGATIVE
NITRATE UR QL: POSITIVE
PH UR STRIP: 6 [PH] (ref 5–8)
RBC #/AREA URNS AUTO: ABNORMAL /HPF
SP GR UR STRIP: 1.01 (ref 1–1.03)
SQUAMOUS #/AREA URNS AUTO: ABNORMAL /LPF
TRIGL SERPL-MCNC: 146 MG/DL
UROBILINOGEN UR STRIP-ACNC: 0.2 E.U./DL
WBC #/AREA URNS AUTO: ABNORMAL /HPF

## 2022-01-03 PROCEDURE — 82947 ASSAY GLUCOSE BLOOD QUANT: CPT | Performed by: FAMILY MEDICINE

## 2022-01-03 PROCEDURE — 99396 PREV VISIT EST AGE 40-64: CPT | Mod: 25 | Performed by: FAMILY MEDICINE

## 2022-01-03 PROCEDURE — 81001 URINALYSIS AUTO W/SCOPE: CPT | Performed by: FAMILY MEDICINE

## 2022-01-03 PROCEDURE — G0123 SCREEN CERV/VAG THIN LAYER: HCPCS | Performed by: FAMILY MEDICINE

## 2022-01-03 PROCEDURE — 87086 URINE CULTURE/COLONY COUNT: CPT | Performed by: FAMILY MEDICINE

## 2022-01-03 PROCEDURE — 90471 IMMUNIZATION ADMIN: CPT | Performed by: FAMILY MEDICINE

## 2022-01-03 PROCEDURE — 87624 HPV HI-RISK TYP POOLED RSLT: CPT | Performed by: FAMILY MEDICINE

## 2022-01-03 PROCEDURE — 90715 TDAP VACCINE 7 YRS/> IM: CPT | Performed by: FAMILY MEDICINE

## 2022-01-03 PROCEDURE — 80061 LIPID PANEL: CPT | Performed by: FAMILY MEDICINE

## 2022-01-03 PROCEDURE — 36415 COLL VENOUS BLD VENIPUNCTURE: CPT | Performed by: FAMILY MEDICINE

## 2022-01-03 PROCEDURE — 99213 OFFICE O/P EST LOW 20 MIN: CPT | Mod: 25 | Performed by: FAMILY MEDICINE

## 2022-01-03 RX ORDER — METRONIDAZOLE 7.5 MG/G
LOTION TOPICAL
Qty: 59 ML | Refills: 1 | Status: SHIPPED | OUTPATIENT
Start: 2022-01-03

## 2022-01-03 ASSESSMENT — ANXIETY QUESTIONNAIRES
GAD7 TOTAL SCORE: 5
2. NOT BEING ABLE TO STOP OR CONTROL WORRYING: NOT AT ALL
1. FEELING NERVOUS, ANXIOUS, OR ON EDGE: NOT AT ALL
3. WORRYING TOO MUCH ABOUT DIFFERENT THINGS: SEVERAL DAYS
7. FEELING AFRAID AS IF SOMETHING AWFUL MIGHT HAPPEN: NOT AT ALL
6. BECOMING EASILY ANNOYED OR IRRITABLE: MORE THAN HALF THE DAYS
IF YOU CHECKED OFF ANY PROBLEMS ON THIS QUESTIONNAIRE, HOW DIFFICULT HAVE THESE PROBLEMS MADE IT FOR YOU TO DO YOUR WORK, TAKE CARE OF THINGS AT HOME, OR GET ALONG WITH OTHER PEOPLE: NOT DIFFICULT AT ALL
5. BEING SO RESTLESS THAT IT IS HARD TO SIT STILL: NOT AT ALL

## 2022-01-03 ASSESSMENT — PATIENT HEALTH QUESTIONNAIRE - PHQ9
SUM OF ALL RESPONSES TO PHQ QUESTIONS 1-9: 7
5. POOR APPETITE OR OVEREATING: MORE THAN HALF THE DAYS

## 2022-01-03 ASSESSMENT — MIFFLIN-ST. JEOR: SCORE: 1509.1

## 2022-01-03 NOTE — PROGRESS NOTES
SUBJECTIVE:   CC: Vibha Hall is an 41 year old woman who presents for preventive health visit.       Patient has been advised of split billing requirements and indicates understanding: Yes  Healthy Habits:     Getting at least 3 servings of Calcium per day:  Yes    Bi-annual eye exam:  Yes    Dental care twice a year:  Yes    Sleep apnea or symptoms of sleep apnea:  Daytime drowsiness    Diet:  Regular (no restrictions)    Duration of exercise:  Less than 15 minutes    Taking medications regularly:  Yes    PHQ-2 Total Score: 2    Additional concerns today:  Yes    1) Perioral dermatitis: chin, wearing mask just 1 hour a day, her chin gets red and inflammed.  Discussed more frequent washing of mask.  Will start metronidazole.    2) Urine smell: for months.  She has upped her supplement game.  Takes 2 B vitamins. Fish oil, turmeric.  Discussed all of these can contribute to smell.  She drinks a lot of water and it is still yellow.       3) Stress: Went to psychiatry per her therapist.  She would like to start to taper off of effexor as it is affecting her libido.  Forgot to take it for 2 days, felt like she was about to get dizzy with movement.  Has been on Effexor 75 mg since October.  She has been tired but feels it is related to carbs.  Will watch coronavirus numbers over the next 2 weeks.  Considering dropping to 37.5 mg XR if stress is lower.           Today's PHQ-2 Score:   PHQ-2 ( 1999 Pfizer) 1/2/2022   Q1: Little interest or pleasure in doing things 1   Q2: Feeling down, depressed or hopeless 1   PHQ-2 Score 2   Q1: Little interest or pleasure in doing things Several days   Q2: Feeling down, depressed or hopeless Several days   PHQ-2 Score 2       Abuse: Current or Past (Physical, Sexual or Emotional) - No  Do you feel safe in your environment? Yes    Have you ever done Advance Care Planning? (For example, a Health Directive, POLST, or a discussion with a medical provider or your loved ones about  your wishes): No, advance care planning information given to patient to review.  Patient declined advance care planning discussion at this time.    Social History     Tobacco Use     Smoking status: Never Smoker     Smokeless tobacco: Never Used   Substance Use Topics     Alcohol use: Not on file         Alcohol Use 1/2/2022   Prescreen: >3 drinks/day or >7 drinks/week? No       Reviewed orders with patient.  Reviewed health maintenance and updated orders accordingly - Yes      Breast Cancer Screening:  Any new diagnosis of family breast, ovarian, or bowel cancer? No    FHS-7: No flowsheet data found.    Mammogram Screening - Offered annual screening and updated Health Maintenance for mutual plan based on risk factor consideration    Pertinent mammograms are reviewed under the imaging tab.    History of abnormal Pap smear: NO - age 30-65 PAP every 5 years with negative HPV co-testing recommended  PAP / HPV 10/10/2016 4/9/2015   PAP Negative for squamous intraepithelial lesion or malignancy  Electronically signed by Graciela Jane CT (ASCP) on 10/17/2016 at  1:23 PM   Negative for squamous intraepithelial lesion or malignancy  Electronically signed by Rosa Saba CT (ASCP) on 4/15/2015 at  3:32 PM       Reviewed and updated as needed this visit by clinical staff  Tobacco  Allergies  Meds             Reviewed and updated as needed this visit by Provider               Patient Active Problem List    Diagnosis Date Noted     Pregnancy      Priority: Medium     Created by Conversion         Sciatica      Priority: Medium     Created by Conversion         Gynecologic Services Intrauterine Device (IUD) Insertion      Priority: Medium     Created by Conversion         Gynecologic Services Intrauterine Device (IUD) Checking      Priority: Medium     Created by Conversion         Current Outpatient Medications   Medication Instructions     ALPRAZolam (XANAX) 0.5 MG tablet [ALPRAZOLAM (XANAX) 0.5 MG TABLET] Take  "one tablet as needed for flight anxiety or before bedtime.     INTRAUTERINE DEVICE, IUD, UTRN [INTRAUTERINE DEVICE, IUD, UTRN] Paraguard.     metroNIDAZOLE (METROLOTION) 0.75 % external lotion Apply twice a day to chin.     venlafaxine (EFFEXOR-XR) 75 mg, Oral, DAILY         Review of Systems  Per above.     OBJECTIVE:   /58   Pulse 84   Ht 1.702 m (5' 7\")   Wt 81.6 kg (180 lb)   SpO2 99%   BMI 28.19 kg/m    Physical Exam  GENERAL: healthy, alert and no distress  EYES: Eyes grossly normal to inspection, PERRL and conjunctivae and sclerae normal  HENT: ear canals and TM's normal, nose and mouth without ulcers or lesions  NECK: no adenopathy, no asymmetry, masses, or scars and thyroid normal to palpation  RESP: lungs clear to auscultation - no rales, rhonchi or wheezes  BREAST: normal without masses, tenderness or nipple discharge and no palpable axillary masses or adenopathy  CV: regular rate and rhythm, normal S1 S2, no S3 or S4, no murmur, click or rub, no peripheral edema and peripheral pulses strong  ABDOMEN: soft, nontender, no hepatosplenomegaly, no masses and bowel sounds normal   (female): normal female external genitalia, normal urethral meatus, vaginal mucosa pink, moist, well rugated, and normal cervix/adnexa/uterus without masses or discharge, iud string at os.  MS: no gross musculoskeletal defects noted, no edema  SKIN: erythema inflammation at chin.  NEURO: Normal strength and tone, mentation intact and speech normal  PSYCH: mentation appears normal, affect normal/bright        ASSESSMENT/PLAN:   Vibha was seen today for physical.    Diagnoses and all orders for this visit:    Routine general medical examination at a health care facility  -     Gynecologic Cytology (PAP Smear)  -     TDAP VACCINE (Adacel, Boostrix)  [[id number]]  -     REVIEW OF HEALTH MAINTENANCE PROTOCOL ORDERS    Abnormal urine odor  -     UA with Microscopic - lab collect; Future  Likely related to the number of " "supplements.  Recommend stopping for a week and adding back.  The bright yellow color likely comes from the turmeric and the fact that she takes 2 B vitamins.  Discussed she should not take more than one.    Lipid screening  -     Lipid panel reflex to direct LDL Fasting; Future    Screening for diabetes mellitus  -     Glucose; Future    Perioral dermatitis  -     metroNIDAZOLE (METROLOTION) 0.75 % external lotion; Apply twice a day to chin.  Discussed applying twice a day to chin and washing mask with each wear.    If no improvement, could consider doxycycline.        Patient has been advised of split billing requirements and indicates understanding: Yes  COUNSELING:  Reviewed preventive health counseling, as reflected in patient instructions       Regular exercise       Healthy diet/nutrition       Osteoporosis prevention/bone health    Estimated body mass index is 28.19 kg/m  as calculated from the following:    Height as of this encounter: 1.702 m (5' 7\").    Weight as of this encounter: 81.6 kg (180 lb).    Weight management plan: Discussed healthy diet and exercise guidelines.  Patient just got a vitamix and looking forward to healthier eating.    She reports that she has never smoked. She has never used smokeless tobacco.      Counseling Resources:  ATP IV Guidelines  Pooled Cohorts Equation Calculator  Breast Cancer Risk Calculator  BRCA-Related Cancer Risk Assessment: FHS-7 Tool  FRAX Risk Assessment  ICSI Preventive Guidelines  Dietary Guidelines for Americans, 2010  USDA's MyPlate  ASA Prophylaxis  Lung CA Screening    Linda Winters MD  [facility]  "

## 2022-01-04 LAB — BACTERIA UR CULT: NORMAL

## 2022-01-04 ASSESSMENT — ANXIETY QUESTIONNAIRES: GAD7 TOTAL SCORE: 5

## 2022-01-05 ENCOUNTER — ANCILLARY PROCEDURE (OUTPATIENT)
Dept: MAMMOGRAPHY | Facility: CLINIC | Age: 42
End: 2022-01-05
Attending: FAMILY MEDICINE
Payer: COMMERCIAL

## 2022-01-05 DIAGNOSIS — Z12.31 VISIT FOR SCREENING MAMMOGRAM: ICD-10-CM

## 2022-01-05 LAB
HUMAN PAPILLOMA VIRUS 16 DNA: NEGATIVE
HUMAN PAPILLOMA VIRUS 18 DNA: NEGATIVE
HUMAN PAPILLOMA VIRUS FINAL DIAGNOSIS: NORMAL
HUMAN PAPILLOMA VIRUS OTHER HR: NEGATIVE

## 2022-01-05 PROCEDURE — 77067 SCR MAMMO BI INCL CAD: CPT | Mod: TC | Performed by: RADIOLOGY

## 2022-01-07 LAB
BKR LAB AP GYN ADEQUACY: NORMAL
BKR LAB AP GYN INTERPRETATION: NORMAL
BKR LAB AP HPV REFLEX: NORMAL
BKR LAB AP LMP: NORMAL
BKR LAB AP PREVIOUS ABNORMAL: NORMAL
PATH REPORT.COMMENTS IMP SPEC: NORMAL
PATH REPORT.COMMENTS IMP SPEC: NORMAL
PATH REPORT.RELEVANT HX SPEC: NORMAL

## 2022-01-13 ENCOUNTER — LAB REQUISITION (OUTPATIENT)
Dept: LAB | Facility: CLINIC | Age: 42
End: 2022-01-13
Payer: COMMERCIAL

## 2022-01-13 DIAGNOSIS — Z20.828 CONTACT WITH AND (SUSPECTED) EXPOSURE TO OTHER VIRAL COMMUNICABLE DISEASES: ICD-10-CM

## 2022-01-13 PROCEDURE — U0005 INFEC AGEN DETEC AMPLI PROBE: HCPCS | Mod: ORL | Performed by: FAMILY MEDICINE

## 2022-01-14 LAB — SARS-COV-2 RNA RESP QL NAA+PROBE: NEGATIVE

## 2022-01-25 ENCOUNTER — MYC MEDICAL ADVICE (OUTPATIENT)
Dept: FAMILY MEDICINE | Facility: CLINIC | Age: 42
End: 2022-01-25
Payer: COMMERCIAL

## 2022-01-25 DIAGNOSIS — R53.81 MALAISE: Primary | ICD-10-CM

## 2022-01-26 RX ORDER — VENLAFAXINE HYDROCHLORIDE 37.5 MG/1
37.5 TABLET, EXTENDED RELEASE ORAL DAILY
Qty: 30 TABLET | Refills: 1 | Status: SHIPPED | OUTPATIENT
Start: 2022-01-26 | End: 2023-09-28

## 2022-09-18 ENCOUNTER — HEALTH MAINTENANCE LETTER (OUTPATIENT)
Age: 42
End: 2022-09-18

## 2023-01-20 ENCOUNTER — ANCILLARY PROCEDURE (OUTPATIENT)
Dept: MAMMOGRAPHY | Facility: CLINIC | Age: 43
End: 2023-01-20
Attending: FAMILY MEDICINE
Payer: COMMERCIAL

## 2023-01-20 DIAGNOSIS — Z12.31 VISIT FOR SCREENING MAMMOGRAM: ICD-10-CM

## 2023-01-20 PROCEDURE — 77067 SCR MAMMO BI INCL CAD: CPT | Mod: TC | Performed by: RADIOLOGY

## 2023-03-29 ENCOUNTER — TRANSFERRED RECORDS (OUTPATIENT)
Dept: HEALTH INFORMATION MANAGEMENT | Facility: CLINIC | Age: 43
End: 2023-03-29

## 2023-09-12 ENCOUNTER — TRANSCRIBE ORDERS (OUTPATIENT)
Dept: OTHER | Age: 43
End: 2023-09-12

## 2023-09-12 ENCOUNTER — MEDICAL CORRESPONDENCE (OUTPATIENT)
Dept: HEALTH INFORMATION MANAGEMENT | Facility: CLINIC | Age: 43
End: 2023-09-12
Payer: COMMERCIAL

## 2023-09-12 DIAGNOSIS — M72.2 PLANTAR FASCIITIS: Primary | ICD-10-CM

## 2023-09-28 ENCOUNTER — OFFICE VISIT (OUTPATIENT)
Dept: PODIATRY | Facility: CLINIC | Age: 43
End: 2023-09-28
Payer: COMMERCIAL

## 2023-09-28 VITALS
OXYGEN SATURATION: 99 % | RESPIRATION RATE: 18 BRPM | DIASTOLIC BLOOD PRESSURE: 76 MMHG | SYSTOLIC BLOOD PRESSURE: 110 MMHG | HEART RATE: 77 BPM

## 2023-09-28 DIAGNOSIS — M72.2 PLANTAR FASCIITIS: ICD-10-CM

## 2023-09-28 PROCEDURE — 20550 NJX 1 TENDON SHEATH/LIGAMENT: CPT | Mod: RT | Performed by: PODIATRIST

## 2023-09-28 PROCEDURE — 99243 OFF/OP CNSLTJ NEW/EST LOW 30: CPT | Mod: 25 | Performed by: PODIATRIST

## 2023-09-28 RX ORDER — LIDOCAINE HYDROCHLORIDE 20 MG/ML
1 INJECTION, SOLUTION INFILTRATION; PERINEURAL ONCE
Status: COMPLETED | OUTPATIENT
Start: 2023-09-28 | End: 2023-09-28

## 2023-09-28 RX ORDER — DEXAMETHASONE SODIUM PHOSPHATE 4 MG/ML
4 INJECTION, SOLUTION INTRA-ARTICULAR; INTRALESIONAL; INTRAMUSCULAR; INTRAVENOUS; SOFT TISSUE ONCE
Status: COMPLETED | OUTPATIENT
Start: 2023-09-28 | End: 2023-09-28

## 2023-09-28 RX ADMIN — DEXAMETHASONE SODIUM PHOSPHATE 4 MG: 4 INJECTION, SOLUTION INTRA-ARTICULAR; INTRALESIONAL; INTRAMUSCULAR; INTRAVENOUS; SOFT TISSUE at 08:18

## 2023-09-28 RX ADMIN — LIDOCAINE HYDROCHLORIDE 1 ML: 20 INJECTION, SOLUTION INFILTRATION; PERINEURAL at 08:18

## 2023-09-28 ASSESSMENT — PAIN SCALES - GENERAL: PAINLEVEL: NO PAIN (0)

## 2023-09-28 NOTE — Clinical Note
"    9/28/2023         RE: Vibha Hall  [address] [place] [address]        Dear Colleague,    Thank you for referring your patient, Vibha Hall, to the [facility]. Please see a copy of my visit note below.    FOOT AND ANKLE SURGERY/PODIATRY CONSULT NOTE         ASSESSMENT:   Plantars fasciitis      TREATMENT:  ***        HPI: I was asked to see Vibha Hoodrell today  ***.  The patient was seen in consultation at the request of Linda ramirez MD for ***.     {PAST MEDICAL HISTORY:800850637::\"***\"}    {SOCIAL HISTORY:[id number]::\"***\"}       Allergies   Allergen Reactions     Bupropion Other (See Comments)     Severe anxiety attacks  Severe anxiety attacks          Current Outpatient Medications:      ALPRAZolam (XANAX) 0.5 MG tablet, Take 1 tablet (0.5 mg) by mouth daily as needed for anxiety, Disp: 10 tablet, Rfl: 0     INTRAUTERINE DEVICE, IUD, UTRN, [INTRAUTERINE DEVICE, IUD, UTRN] Paraguard., Disp: , Rfl:      metroNIDAZOLE (METROLOTION) 0.75 % external lotion, Apply twice a day to chin., Disp: 59 mL, Rfl: 1     venlafaxine (EFFEXOR-ER) 37.5 MG 24 hr tablet, Take 1 tablet (37.5 mg) by mouth daily, Disp: 30 tablet, Rfl: 1     venlafaxine (EFFEXOR-XR) 75 MG 24 hr capsule, Take 1 capsule (75 mg) by mouth daily, Disp: 90 capsule, Rfl: 1     Family History   Problem Relation Age of Onset     Cancer Mother 47.00        ? bone cancer     Breast Cancer Mother      Cancer Maternal Grandfather      Chronic Obstructive Pulmonary Disease Paternal Aunt         smoker     Cancer Paternal Uncle         tongue cancer - smoker        Social History     Socioeconomic History     Marital status:      Spouse name: Not on file     Number of children: Not on file     Years of education: Not on file     Highest education level: Not on file   Occupational History     Not on file   Tobacco Use     Smoking status: Never     Smokeless tobacco: Never   Substance and Sexual Activity     Alcohol use: " "Not on file     Drug use: Not on file     Sexual activity: Not on file   Other Topics Concern     Not on file   Social History Narrative     Not on file     Social Determinants of Health     Financial Resource Strain: Not on file   Food Insecurity: Not on file   Transportation Needs: Not on file   Physical Activity: Not on file   Stress: Not on file   Social Connections: Not on file   Interpersonal Safety: Not on file   Housing Stability: Not on file        Review of Systems - Patient denies fever, chills, rash, wound, stiffness, limping, numbness, weakness, heart burn, blood in stool, chest pain with activity, calf pain when walking, shortness of breath with activity, chronic cough, easy bleeding/bruising, swelling of ankles, excessive thirst, fatigue, depression, anxiety.  Patient admits to ***.      OBJECTIVE:  Appearance: {appearance:813365::\"alert, well appearing, and in no distress\"}.    There were no vitals taken for this visit.     There is no height or weight on file to calculate BMI.     General appearance: Patient is alert and fully cooperative with history & exam.  No sign of distress is noted during the visit.  Psychiatric: Affect is pleasant & appropriate.  Patient appears motivated to improve health.  Respiratory: Breathing is regular & unlabored while sitting.  HEENT: Hearing is intact to spoken word.  Speech is clear.  No gross evidence of visual impairment that would impact ambulation.    Vascular: Dorsalis pedis and posterior tibial pulses are palpable. There is pedal hair growth ***.  CFT < 3 sec from anterior tibial surface to distal digits ***. There is no appreciable edema noted.  Dermatologic: Turgor and texture are within normal limits. No coloration or temperature changes. No primary or secondary lesions noted.  Neurologic: All epicritic and proprioceptive sensations are grossly intact ***.  Musculoskeletal: All active and passive ankle, subtalar, midtarsal, and 1st MPJ range of motion are " grossly intact without pain or crepitus, with the exception of ***. Manual muscle strength is ***. All dorsiflexors, plantarflexors, invertors, evertors are intact ***. Tenderness present to *** on palpation. Tenderness to *** with range of motion. Calf is soft/non-tender with/without warmth/induration    Imaging:     {Imaging order/result:71348}  No images are attached to the encounter or orders placed in the encounter.     No results found.   No results found.       TREATMENT:  ***    Galo Wiggins DPM  [facility]/Podiatry         Again, thank you for allowing me to participate in the care of your patient.        Sincerely,        Galo Pickett DPM

## 2023-09-28 NOTE — PATIENT INSTRUCTIONS
What are Prescription Custom Orthotics?  Custom orthotics are specially-made devices designed to support and comfort your feet. Prescription orthotics are crafted for you and no one else. They match the contours of your feet precisely and are designed for the way you move. Orthotics are only manufactured after a podiatrist has conducted a complete evaluation of your feet, ankles, and legs, so the orthotic can accommodate your unique foot structure and pathology.  Prescription orthotics are divided into two categories:  Functional orthotics are designed to control abnormal motion. They may be used to treat foot pain caused by abnormal motion; they can also be used to treat injuries such as shin splints or tendinitis. Functional orthotics are usually crafted of a semi-rigid material such as plastic or graphite.  Accommodative orthotics are softer and meant to provide additional cushioning and support. They can be used to treat diabetic foot ulcers, painful calluses on the bottom of the foot, and other uncomfortable conditions.  Podiatrists use orthotics to treat foot problems such as plantar fasciitis, bursitis, tendinitis, diabetic foot ulcers, and foot, ankle, and heel pain. Clinical research studies have shown that podiatrist-prescribed foot orthotics decrease foot pain and improve function.  Orthotics typically cost more than shoe inserts purchased in a retail store, but the additional cost is usually well worth it. Unlike shoe inserts, orthotics are molded to fit each individual foot, so you can be sure that your orthotics fit and do what they're supposed to do. Prescription orthotics are also made of top-notch materials and last many years when cared for properly. Insurance often helps pay for prescription orthotics.  What are Shoe Inserts?   You've seen them at the grocery store and at the mall. You've probably even seen them on TV and online. Shoe inserts are any kind of non-prescription foot support designed  to be worn inside a shoe. Pre-packaged, mass produced, arch supports are shoe inserts. So are the  custom-made  insoles and foot supports that you can order online or at retail stores. Unless the device has been prescribed by a doctor and crafted for your specific foot, it's a shoe insert, not a custom orthotic device--despite what the ads might say.  Shoe inserts can be very helpful for a variety of foot ailments, including flat arches and foot and leg pain. They can cushion your feet, provide comfort, and support your arches, but they can't correct biomechanical foot problems or cure long-standing foot issues.  The most common types of shoe inserts are:  Arch supports: Some people have high arches. Others have low arches or flat feet. Arch supports generally have a  bumped-up  appearance and are designed to support the foot's natural arch.   Insoles: Insoles slip into your shoe to provide extra cushioning and support. Insoles are often made of gel, foam, or plastic.   Heel liners: Heel liners, sometimes called heel pads or heel cups, provide extra cushioning in the heel region. They may be especially useful for patients who have foot pain caused by age-related thinning of the heels' natural fat pads.   Foot cushions: Do your shoes rub against your heel or your toes? Foot cushions come in many different shapes and sizes and can be used as a barrier between you and your shoe.  Choosing an Over-the-Counter Shoe Insert  Selecting a shoe insert from the wide variety of devices on the market can be overwhelming. Here are some podiatrist-tested tips to help you find the insert that best meets your needs:  Consider your health. Do you have diabetes? Problems with circulation? An over-the-counter insert may not be your best bet. Diabetes and poor circulation increase your risk of foot ulcers and infections, so schedule an appointment with a podiatrist. He or she can help you select a solution that won't cause additional  health problems.   Think about the purpose. Are you planning to run a marathon, or do you just need a little arch support in your work shoes? Look for a product that fits your planned level of activity.   Bring your shoes. For the insert to be effective, it has to fit into your shoes. So bring your sneakers, dress shoes, or work boots--whatever you plan to wear with your insert. Look for an insert that will fit the contours of your shoe.   Try them on. If all possible, slip the insert into your shoe and try it out. Walk around a little. How does it feel? Don't assume that feelings of pressure will go away with continued wear. (If you can't try the inserts at the store, ask about the store's return policy and hold on to your receipt.)    Please call one of the [place] locations below to schedule an appointment. If you received a prescription please bring it with you to your appointment. Some locations are limited to what they carry.    Office Locations    [facility]  [address] [place] [address]  Home Medical Equipment, [address]   Phone: [phone]   Orthotics and Prosthetics, [address]   Phone: [phone]    [facility]   Home Medical Equipment   [address], [place] [address]  Phone :[phone]  Orthotics and Prosthetics   [address], [address], [facility] [address]  Phone:[phone]          [facility] [place]  [address]   [place] [address]   Phone: [phone]    [facility]  [address]  [place] [address]  Phone: [phone]    [facility]   [address]  [place] [address]  Phone: [phone]    [facility]  [address]  [place] [address]  Phone:  [phone]    [facility]  [address]  [place] [address]  Phone: [phone]    [place]   [address] [place] [address]   Phone: [phone]    WEARING YOUR CUSTOM FOOT ORTHOTICS   Most insurance plans cover one pair of orthotics per year. You must check with your   insurance plan to see what your payment responsibility will be. Please call your   insurance company by calling the number on the back of your insurance card.   Orthotic's are non-refundable and non-returnable.   Orthotics are made of various designs. Some orthotics are covered with material that extends beyond your toes. If your orthotic is of this design, you will likely need to trim the toe end to get a proper fit. The insole from your shoe can be used as a template. Simply overlay the shoe insert on top of the custom orthotic. Align the heel end while tracing the length of the insert onto the custom orthotic. Use a large scissor to trim the toe end until you get a proper fit in the shoe.   The orthotic needs to be pushed as far back in the shoe as possible. The heel portion should not ride forward so as not to irritate your heel.   Orthotics are designed to work with socks. Excessive perspiration will shorten the life span of the orthotics. Remove the orthotic from the shoe frequently for proper drying.   The break-in period lasts for weeks. People new to orthotics will likely experience new aches and pains. The orthotic is forcing your foot into a new position. Arch, foot and leg muscle aches and fatigue are common during these weeks. Minor discomfort can be considered normal break in phenomenon. Start wearing your orthotic around your home your first day. Limited activity for one to two hours is recommended. You can increase one or two additional hours each day provided the aches and pains are subsiding. The degree of discomfort, fatigue and problems will dictate the speed of break  in. You may require multiple weeks to work up to full time use.   Do not continue wearing your orthotics if they are creating problems such as blisters or sores. Do not hesitate to call the clinic to speak with a nurse regarding orthotic   break in, fit, trimming, etc. You may also need to see the doctor if the orthotics are   simply not working out. Adjustments are sometimes made to improve orthotic   function.     Orthotics will only work in certain styles and types of shoes. Orthotics rarely work in dress shoes. Slip-ons, clogs, sandals and heels are particularly troublesome. Specially designed orthotics may be necessary for these types of shoes. Your custom orthotic was designed for activities that require appropriate walking or running shoes. Lace up athletic shoes, walking shoes or work boots should work appropriately. You may need a wider or longer shoe. Shoes with a removable  or insert work best. In general, you want to remove an insert from the shoe before placing the orthotic into the shoe. Shoes without a removable liner may not work as well.     When purchasing new shoes, bring your orthotics along to get a proper fit. Shop at stores that are familiar with orthotics.   Frequent washing of the orthotic may shorten the life span of the top cover. The top cover can be replaced but will generally last one to five years depending on use and foot perspiration.

## 2023-09-28 NOTE — PROGRESS NOTES
FOOT AND ANKLE SURGERY/PODIATRY CONSULT NOTE         ASSESSMENT:   Plantar fasciitis right foot      TREATMENT:  The patient was given a cortisone injection in the right heel today consisting of 1 cc of dexamethasone sodium phosphate and 1 cc of 2% lidocaine plain.  I have also recommended orthotics.  I have asked the patient to return to the clinic in 1 week if her pain persist at which time I will recommend a second cortisone injection.        HPI: I was asked to see Vibha Hall today to evaluate and treat right heel pain.  The patient indicated that she has had this heel pain for several months.  The pain is located on the bottom of the heel and is aggravated with weightbearing and ambulation.  She denies any trauma to her foot.  She has not noticed any redness, swelling surrounding her heel.  The pain is more pronounced when she first gets out of bed in the mornings.  She has no pain while resting.  She denies any other previous treatment.  The patient was seen in consultation at the request of Linda ramirez MD for evaluation and treatment of right heel pain.     History reviewed. No pertinent past medical history.    Social History     Socioeconomic History    Marital status:      Spouse name: Not on file    Number of children: Not on file    Years of education: Not on file    Highest education level: Not on file   Occupational History    Not on file   Tobacco Use    Smoking status: Never    Smokeless tobacco: Never   Substance and Sexual Activity    Alcohol use: Not on file    Drug use: Not on file    Sexual activity: Not on file   Other Topics Concern    Not on file   Social History Narrative    Not on file     Social Determinants of Health     Financial Resource Strain: Not on file   Food Insecurity: Not on file   Transportation Needs: Not on file   Physical Activity: Not on file   Stress: Not on file   Social Connections: Not on file   Interpersonal Safety: Not on file   Housing Stability: Not on  file          Allergies   Allergen Reactions    Bupropion Other (See Comments)     Severe anxiety attacks  Severe anxiety attacks          Current Outpatient Medications:     ALPRAZolam (XANAX) 0.5 MG tablet, Take 1 tablet (0.5 mg) by mouth daily as needed for anxiety, Disp: 10 tablet, Rfl: 0    INTRAUTERINE DEVICE, IUD, UTRN, [INTRAUTERINE DEVICE, IUD, UTRN] Paraguard., Disp: , Rfl:     metroNIDAZOLE (METROLOTION) 0.75 % external lotion, Apply twice a day to chin., Disp: 59 mL, Rfl: 1    venlafaxine (EFFEXOR-ER) 37.5 MG 24 hr tablet, Take 1 tablet (37.5 mg) by mouth daily, Disp: 30 tablet, Rfl: 1    venlafaxine (EFFEXOR-XR) 75 MG 24 hr capsule, Take 1 capsule (75 mg) by mouth daily, Disp: 90 capsule, Rfl: 1     Family History   Problem Relation Age of Onset    Cancer Mother 47.00        ? bone cancer    Breast Cancer Mother     Cancer Maternal Grandfather     Chronic Obstructive Pulmonary Disease Paternal Aunt         smoker    Cancer Paternal Uncle         tongue cancer - smoker        Social History     Socioeconomic History    Marital status:      Spouse name: Not on file    Number of children: Not on file    Years of education: Not on file    Highest education level: Not on file   Occupational History    Not on file   Tobacco Use    Smoking status: Never    Smokeless tobacco: Never   Substance and Sexual Activity    Alcohol use: Not on file    Drug use: Not on file    Sexual activity: Not on file   Other Topics Concern    Not on file   Social History Narrative    Not on file     Social Determinants of Health     Financial Resource Strain: Not on file   Food Insecurity: Not on file   Transportation Needs: Not on file   Physical Activity: Not on file   Stress: Not on file   Social Connections: Not on file   Interpersonal Safety: Not on file   Housing Stability: Not on file        Review of Systems - Patient denies fever, chills, rash, wound, stiffness, limping, numbness, weakness, heart burn, blood in  stool, chest pain with activity, calf pain when walking, shortness of breath with activity, chronic cough, easy bleeding/bruising, swelling of ankles, excessive thirst, fatigue, depression, anxiety.  Patient admits to right heel pain.      OBJECTIVE:  Appearance: alert, well appearing, and in no distress.    There were no vitals taken for this visit.     There is no height or weight on file to calculate BMI.     General appearance: Patient is alert and fully cooperative with history & exam.  No sign of distress is noted during the visit.  Psychiatric: Affect is pleasant & appropriate.  Patient appears motivated to improve health.  Respiratory: Breathing is regular & unlabored while sitting.  HEENT: Hearing is intact to spoken word.  Speech is clear.  No gross evidence of visual impairment that would impact ambulation.    Vascular: Dorsalis pedis and posterior tibial pulses are palpable. There is no pedal hair growth bilaterally.  CFT < 3 sec from anterior tibial surface to distal digits bilaterally. There is no appreciable edema noted.  Dermatologic: Turgor and texture are within normal limits. No coloration or temperature changes. No primary or secondary lesions noted.  Neurologic: All epicritic and proprioceptive sensations are grossly intact bilaterally.  Musculoskeletal: All active and passive ankle, subtalar, midtarsal, and 1st MPJ range of motion are grossly intact without pain or crepitus, with the exception of none. Manual muscle strength is within normal limits bilaterally. All dorsiflexors, plantarflexors, invertors, evertors are intact bilaterally. Tenderness present to the plantar medial aspect of the right heel on palpation.  No tenderness to the right foot or ankle with range of motion. Calf is soft/non-tender without warmth/induration    Imaging:       No images are attached to the encounter or orders placed in the encounter.     No results found.   No results found.       Galo LOVE  [facility] Foot [facility] Ankle Surgery/Podiatry

## 2024-05-05 ENCOUNTER — HEALTH MAINTENANCE LETTER (OUTPATIENT)
Age: 44
End: 2024-05-05

## 2024-07-09 ENCOUNTER — ANCILLARY PROCEDURE (OUTPATIENT)
Dept: MAMMOGRAPHY | Facility: CLINIC | Age: 44
End: 2024-07-09
Attending: FAMILY MEDICINE
Payer: COMMERCIAL

## 2024-07-09 DIAGNOSIS — Z12.31 VISIT FOR SCREENING MAMMOGRAM: ICD-10-CM

## 2024-07-09 PROCEDURE — 77067 SCR MAMMO BI INCL CAD: CPT | Mod: TC | Performed by: STUDENT IN AN ORGANIZED HEALTH CARE EDUCATION/TRAINING PROGRAM

## 2024-07-09 PROCEDURE — 77063 BREAST TOMOSYNTHESIS BI: CPT | Mod: TC | Performed by: STUDENT IN AN ORGANIZED HEALTH CARE EDUCATION/TRAINING PROGRAM

## 2025-07-31 ENCOUNTER — ANCILLARY PROCEDURE (OUTPATIENT)
Dept: MAMMOGRAPHY | Facility: CLINIC | Age: 45
End: 2025-07-31
Attending: FAMILY MEDICINE
Payer: COMMERCIAL

## 2025-07-31 DIAGNOSIS — Z12.31 VISIT FOR SCREENING MAMMOGRAM: ICD-10-CM

## 2025-07-31 PROCEDURE — 77063 BREAST TOMOSYNTHESIS BI: CPT | Mod: TC | Performed by: RADIOLOGY

## 2025-07-31 PROCEDURE — 77067 SCR MAMMO BI INCL CAD: CPT | Mod: TC | Performed by: RADIOLOGY

## 2025-08-20 ENCOUNTER — HOSPITAL ENCOUNTER (OUTPATIENT)
Dept: MAMMOGRAPHY | Facility: CLINIC | Age: 45
Discharge: HOME | End: 2025-08-20
Attending: FAMILY MEDICINE
Payer: COMMERCIAL

## 2025-08-20 DIAGNOSIS — R92.8 ABNORMAL MAMMOGRAM: ICD-10-CM

## 2025-08-20 PROCEDURE — 77065 DX MAMMO INCL CAD UNI: CPT | Mod: RT
